# Patient Record
Sex: FEMALE | Race: WHITE | NOT HISPANIC OR LATINO | Employment: UNEMPLOYED | URBAN - NONMETROPOLITAN AREA
[De-identification: names, ages, dates, MRNs, and addresses within clinical notes are randomized per-mention and may not be internally consistent; named-entity substitution may affect disease eponyms.]

---

## 2017-02-22 ENCOUNTER — OFFICE VISIT (OUTPATIENT)
Dept: CARDIOLOGY | Facility: CLINIC | Age: 52
End: 2017-02-22

## 2017-02-22 VITALS
DIASTOLIC BLOOD PRESSURE: 60 MMHG | BODY MASS INDEX: 23.78 KG/M2 | HEIGHT: 66 IN | OXYGEN SATURATION: 97 % | WEIGHT: 148 LBS | SYSTOLIC BLOOD PRESSURE: 132 MMHG | HEART RATE: 116 BPM

## 2017-02-22 DIAGNOSIS — R07.9 CHEST PAIN, UNSPECIFIED TYPE: Primary | ICD-10-CM

## 2017-02-22 DIAGNOSIS — Z72.0 TOBACCO ABUSE: ICD-10-CM

## 2017-02-22 DIAGNOSIS — K21.9 GASTROESOPHAGEAL REFLUX DISEASE, ESOPHAGITIS PRESENCE NOT SPECIFIED: ICD-10-CM

## 2017-02-22 PROBLEM — I35.8 AORTIC VALVE SCLEROSIS: Status: ACTIVE | Noted: 2017-02-22

## 2017-02-22 PROBLEM — F41.9 ANXIETY: Status: ACTIVE | Noted: 2017-02-22

## 2017-02-22 PROBLEM — E03.9 HYPOTHYROID: Status: ACTIVE | Noted: 2017-02-22

## 2017-02-22 PROBLEM — G89.29 CHRONIC PAIN: Status: ACTIVE | Noted: 2017-02-22

## 2017-02-22 PROBLEM — F90.9 ADHD (ATTENTION DEFICIT HYPERACTIVITY DISORDER): Status: ACTIVE | Noted: 2017-02-22

## 2017-02-22 PROBLEM — R60.9 CHRONIC EDEMA: Status: ACTIVE | Noted: 2017-02-22

## 2017-02-22 PROCEDURE — 93000 ELECTROCARDIOGRAM COMPLETE: CPT | Performed by: INTERNAL MEDICINE

## 2017-02-22 PROCEDURE — 99214 OFFICE O/P EST MOD 30 MIN: CPT | Performed by: INTERNAL MEDICINE

## 2017-02-22 RX ORDER — PANTOPRAZOLE SODIUM 40 MG/1
40 TABLET, DELAYED RELEASE ORAL DAILY
COMMUNITY
Start: 2017-02-18

## 2017-02-22 RX ORDER — CONJUGATED ESTROGENS 0.62 MG/G
0.62 CREAM VAGINAL WEEKLY
COMMUNITY
Start: 2017-02-18

## 2017-02-22 RX ORDER — TRIAMTERENE AND HYDROCHLOROTHIAZIDE 37.5; 25 MG/1; MG/1
CAPSULE ORAL AS NEEDED
COMMUNITY
Start: 2017-02-18

## 2017-02-22 RX ORDER — NICOTINE 21 MG/24HR
1 PATCH, TRANSDERMAL 24 HOURS TRANSDERMAL EVERY 24 HOURS
Qty: 30 PATCH | Refills: 11 | Status: SHIPPED | OUTPATIENT
Start: 2017-02-22 | End: 2017-08-16 | Stop reason: SDUPTHER

## 2017-02-22 RX ORDER — LOPERAMIDE HYDROCHLORIDE 2 MG/1
2 CAPSULE ORAL DAILY
COMMUNITY
Start: 2017-02-11

## 2017-02-22 RX ORDER — HYDROCODONE BITARTRATE AND ACETAMINOPHEN 10; 325 MG/1; MG/1
10-325 TABLET ORAL 4 TIMES DAILY
COMMUNITY
Start: 2017-02-18

## 2017-02-22 RX ORDER — CYANOCOBALAMIN 1000 UG/ML
1000 INJECTION, SOLUTION INTRAMUSCULAR; SUBCUTANEOUS
COMMUNITY
Start: 2017-02-18

## 2017-02-22 RX ORDER — NYSTATIN 100000 U/G
10000 CREAM TOPICAL
COMMUNITY
Start: 2017-02-18

## 2017-02-22 RX ORDER — SUCRALFATE 1 G/1
1 TABLET ORAL AS NEEDED
COMMUNITY
Start: 2017-02-11

## 2017-02-22 RX ORDER — DEXTROAMPHETAMINE SACCHARATE, AMPHETAMINE ASPARTATE MONOHYDRATE, DEXTROAMPHETAMINE SULFATE AND AMPHETAMINE SULFATE 6.25; 6.25; 6.25; 6.25 MG/1; MG/1; MG/1; MG/1
25 CAPSULE, EXTENDED RELEASE ORAL DAILY
COMMUNITY
Start: 2017-02-18

## 2017-02-22 RX ORDER — LEVOTHYROXINE SODIUM 25 MCG
25 TABLET ORAL DAILY
COMMUNITY
Start: 2017-01-31

## 2017-02-22 RX ORDER — DIAZEPAM 10 MG/1
10 TABLET ORAL 4 TIMES DAILY
COMMUNITY
Start: 2017-02-18

## 2017-02-22 RX ORDER — CETIRIZINE HYDROCHLORIDE 10 MG/1
10 TABLET ORAL DAILY
COMMUNITY
Start: 2017-01-19

## 2017-02-22 RX ORDER — NAPROXEN 500 MG/1
500 TABLET ORAL AS NEEDED
COMMUNITY
Start: 2017-02-18

## 2017-02-22 RX ORDER — CLOTRIMAZOLE 1 %
1 CREAM (GRAM) TOPICAL DAILY
COMMUNITY
Start: 2016-11-21

## 2017-02-22 RX ORDER — FLUTICASONE PROPIONATE 50 MCG
50 SPRAY, SUSPENSION (ML) NASAL DAILY
COMMUNITY
Start: 2017-02-18

## 2017-02-22 RX ORDER — MELOXICAM 7.5 MG/1
7.5 TABLET ORAL AS NEEDED
COMMUNITY
Start: 2017-02-18

## 2017-02-22 NOTE — PROGRESS NOTES
Reason for Visit: chest pain.    HPI:  Teresa Long is a 52 y.o. female is here today for follow-up.  She has significant past medical history of aortic valve sclerosis, chronic edema, tobacco abuse, GERD, hypothyroidism, anal cancer, anxiety/depression, history of colon cancer, and chronic pain.  She had an episode of chest pain about 2 weeks that lasted about 2 days before subsiding.  She thinks that gas pain might have played a role.  She denies any palpitations but does feel like her heart rate is irregular at times.  She smokes 1 PPD.      Patient Active Problem List   Diagnosis   • Aortic valve sclerosis   • Chronic edema   • Tobacco abuse   • GERD (gastroesophageal reflux disease)   • Hypothyroid   • Anxiety   • ADHD (attention deficit hyperactivity disorder)   • Chronic pain       Social History   Substance Use Topics   • Smoking status: Current Some Day Smoker     Packs/day: 1.00   • Smokeless tobacco: Never Used   • Alcohol use Yes      Comment: Occasional       Family History   Problem Relation Age of Onset   • Stroke Mother    • Heart failure Mother        The following portions of the patient's history were reviewed and updated as appropriate: allergies, current medications, past family history, past medical history, past social history, past surgical history and problem list.      Current Outpatient Prescriptions:   •  ADVAIR DISKUS 100-50 MCG/DOSE DISKUS, Daily., Disp: , Rfl:   •  amphetamine-dextroamphetamine XR (ADDERALL XR) 25 MG 24 hr capsule, Take 25 mg by mouth Daily., Disp: , Rfl:   •  cetirizine (zyrTEC) 10 MG tablet, Take 10 mg by mouth Daily., Disp: , Rfl:   •  clotrimazole (LOTRIMIN) 1 % cream, Apply 1 application topically Daily., Disp: , Rfl:   •  cyanocobalamin 1000 MCG/ML injection, Inject 1,000 mg into the shoulder, thigh, or buttocks Every 30 (Thirty) Days., Disp: , Rfl:   •  diazePAM (VALIUM) 10 MG tablet, Take 10 mg by mouth 4 (Four) Times a Day., Disp: , Rfl:   •  fluticasone  (FLONASE) 50 MCG/ACT nasal spray, 50 sprays into each nostril Daily., Disp: , Rfl:   •  HYDROcodone-acetaminophen (NORCO)  MG per tablet, Take  tablets by mouth 4 (Four) Times a Day., Disp: , Rfl:   •  loperamide (IMODIUM) 2 MG capsule, Take 2 mg by mouth Daily., Disp: , Rfl:   •  meloxicam (MOBIC) 7.5 MG tablet, Take 7.5 mg by mouth As Needed., Disp: , Rfl:   •  naproxen (NAPROSYN) 500 MG tablet, Take 500 mg by mouth As Needed., Disp: , Rfl:   •  nystatin (MYCOSTATIN) 210649 UNIT/GM cream, Apply 10,000 application topically., Disp: , Rfl:   •  pantoprazole (PROTONIX) 40 MG EC tablet, Take 40 mg by mouth Daily., Disp: , Rfl:   •  PREMARIN 0.625 MG/GM vaginal cream, 0.625 application 1 (One) Time Per Week., Disp: , Rfl:   •  sertraline (ZOLOFT) 50 MG tablet, Take 50 mg by mouth Daily., Disp: , Rfl:   •  sucralfate (CARAFATE) 1 G tablet, Take 1 g by mouth As Needed., Disp: , Rfl:   •  SYNTHROID 25 MCG tablet, Take 25 mg by mouth Daily., Disp: , Rfl:   •  triamterene-hydrochlorothiazide (DYAZIDE) 37.5-25 MG per capsule, As Needed., Disp: , Rfl:   •  VENTOLIN  (90 BASE) MCG/ACT inhaler, As Needed., Disp: , Rfl:     Review of Systems   Constitution: Positive for decreased appetite and weakness. Negative for chills and fever.   HENT: Positive for congestion and headaches. Negative for nosebleeds.    Eyes: Negative for blurred vision and double vision.   Cardiovascular: Positive for chest pain, irregular heartbeat and leg swelling. Negative for palpitations and syncope.   Respiratory: Positive for cough, shortness of breath and wheezing.    Endocrine: Positive for heat intolerance. Negative for cold intolerance.   Hematologic/Lymphatic: Does not bruise/bleed easily.   Skin: Positive for dry skin. Negative for rash.   Musculoskeletal: Positive for back pain, joint pain, muscle cramps (legs), neck pain and stiffness.   Gastrointestinal: Positive for diarrhea and melena. Negative for abdominal pain,  "constipation, heartburn, nausea and vomiting.   Genitourinary: Negative for hematuria and nocturia.   Neurological: Positive for dizziness. Negative for light-headedness, loss of balance and numbness.   Psychiatric/Behavioral: Positive for depression. The patient is nervous/anxious. The patient does not have insomnia.        Objective   Visit Vitals   • /60 (BP Location: Right arm, Patient Position: Sitting, Cuff Size: Adult)   • Pulse 116   • Ht 66\" (167.6 cm)   • Wt 148 lb (67.1 kg)   • SpO2 97%   • BMI 23.89 kg/m2     Physical Exam   Constitutional: She is oriented to person, place, and time. She appears well-developed and well-nourished.   HENT:   Head: Normocephalic and atraumatic.   Cardiovascular: Regular rhythm and normal heart sounds.  Tachycardia present.    No murmur heard.  Pulmonary/Chest: Effort normal and breath sounds normal.   Musculoskeletal: She exhibits no edema.   Neurological: She is alert and oriented to person, place, and time.   Skin: Skin is warm and dry.   Psychiatric: She has a normal mood and affect.       ECG 12 Lead  Date/Time: 2/22/2017 11:39 AM  Performed by: NARESH NAVARRO  Authorized by: NARESH NAVARRO   Comparison: compared with previous ECG from 2/15/2017  Similar to previous ECG  Rhythm: sinus tachycardia  Other findings: LVH and LAE              ICD-10-CM ICD-9-CM   1. Chest pain, unspecified type R07.9 786.50   2. Tobacco abuse Z72.0 305.1   3. Gastroesophageal reflux disease, esophagitis presence not specified K21.9 530.81         Assessment/Plan:  1. Chest pain: Etiology unclear.  Is a heavy smoker putting her at increased risk for cardiac events.  Will evaluate further with a stress echo.      2. Tobacco abuse:   on smoking cessation.  Currently smoking 1 PPD and interested in quiting.  Will prescribe nicotine patches.      3. GERD: Possibly playing a role in chest pain.  Currently managed on acid reflux.      "

## 2017-06-14 ENCOUNTER — OUTSIDE FACILITY SERVICE (OUTPATIENT)
Dept: CARDIOLOGY | Facility: CLINIC | Age: 52
End: 2017-06-14

## 2017-06-14 PROCEDURE — 93018 CV STRESS TEST I&R ONLY: CPT | Performed by: INTERNAL MEDICINE

## 2017-06-14 PROCEDURE — 93350 STRESS TTE ONLY: CPT | Performed by: INTERNAL MEDICINE

## 2017-08-16 ENCOUNTER — OFFICE VISIT (OUTPATIENT)
Dept: CARDIOLOGY | Facility: CLINIC | Age: 52
End: 2017-08-16

## 2017-08-16 VITALS
HEIGHT: 66 IN | OXYGEN SATURATION: 98 % | WEIGHT: 148 LBS | DIASTOLIC BLOOD PRESSURE: 70 MMHG | SYSTOLIC BLOOD PRESSURE: 140 MMHG | HEART RATE: 115 BPM | BODY MASS INDEX: 23.78 KG/M2

## 2017-08-16 DIAGNOSIS — Z72.0 TOBACCO ABUSE: ICD-10-CM

## 2017-08-16 DIAGNOSIS — R07.89 OTHER CHEST PAIN: Primary | ICD-10-CM

## 2017-08-16 DIAGNOSIS — I10 ESSENTIAL HYPERTENSION: ICD-10-CM

## 2017-08-16 DIAGNOSIS — R00.0 SINUS TACHYCARDIA: ICD-10-CM

## 2017-08-16 PROCEDURE — 99214 OFFICE O/P EST MOD 30 MIN: CPT | Performed by: INTERNAL MEDICINE

## 2017-08-16 PROCEDURE — 93000 ELECTROCARDIOGRAM COMPLETE: CPT | Performed by: INTERNAL MEDICINE

## 2017-08-16 RX ORDER — LANCETS 33 GAUGE
EACH MISCELLANEOUS
COMMUNITY
Start: 2017-07-26

## 2017-08-16 RX ORDER — CARVEDILOL 3.12 MG/1
3.12 TABLET ORAL 2 TIMES DAILY
Qty: 60 TABLET | Refills: 11 | Status: SHIPPED | OUTPATIENT
Start: 2017-08-16

## 2017-08-16 RX ORDER — BLOOD-GLUCOSE METER
EACH MISCELLANEOUS
COMMUNITY
Start: 2017-07-26

## 2017-08-16 RX ORDER — NICOTINE 21 MG/24HR
1 PATCH, TRANSDERMAL 24 HOURS TRANSDERMAL EVERY 24 HOURS
Qty: 30 PATCH | Refills: 11 | Status: SHIPPED | OUTPATIENT
Start: 2017-08-16 | End: 2019-05-01
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
## 2018-09-05 DIAGNOSIS — Z72.0 TOBACCO ABUSE: ICD-10-CM

## 2018-09-06 RX ORDER — NICOTINE 21 MG/24HR
PATCH, TRANSDERMAL 24 HOURS TRANSDERMAL
OUTPATIENT
Start: 2018-09-06

## 2019-01-09 ENCOUNTER — LAB REQUISITION (OUTPATIENT)
Dept: LAB | Facility: HOSPITAL | Age: 54
End: 2019-01-09

## 2019-01-09 DIAGNOSIS — Z00.00 ENCOUNTER FOR GENERAL ADULT MEDICAL EXAMINATION WITHOUT ABNORMAL FINDINGS: ICD-10-CM

## 2019-01-09 LAB
ALBUMIN SERPL-MCNC: 4.3 G/DL (ref 3.5–5)
ALBUMIN/GLOB SERPL: 1.6 G/DL (ref 1.1–2.5)
ALP SERPL-CCNC: 69 U/L (ref 24–120)
ALT SERPL W P-5'-P-CCNC: 26 U/L (ref 0–54)
ANION GAP SERPL CALCULATED.3IONS-SCNC: 9 MMOL/L (ref 4–13)
AST SERPL-CCNC: 23 U/L (ref 7–45)
BILIRUB SERPL-MCNC: 0.5 MG/DL (ref 0.1–1)
BUN BLD-MCNC: 10 MG/DL (ref 5–21)
BUN/CREAT SERPL: 18.9 (ref 7–25)
CALCIUM SPEC-SCNC: 10 MG/DL (ref 8.4–10.4)
CEA SERPL-MCNC: 2.21 NG/ML (ref 0–5)
CHLORIDE SERPL-SCNC: 99 MMOL/L (ref 98–110)
CO2 SERPL-SCNC: 31 MMOL/L (ref 24–31)
CREAT BLD-MCNC: 0.53 MG/DL (ref 0.5–1.4)
GFR SERPL CREATININE-BSD FRML MDRD: 120 ML/MIN/1.73
GLOBULIN UR ELPH-MCNC: 2.7 GM/DL
GLUCOSE BLD-MCNC: 108 MG/DL (ref 70–100)
POTASSIUM BLD-SCNC: 4.2 MMOL/L (ref 3.5–5.3)
PROT SERPL-MCNC: 7 G/DL (ref 6.3–8.7)
SODIUM BLD-SCNC: 139 MMOL/L (ref 135–145)

## 2019-01-09 PROCEDURE — 82378 CARCINOEMBRYONIC ANTIGEN: CPT | Performed by: INTERNAL MEDICINE

## 2019-01-09 PROCEDURE — 80053 COMPREHEN METABOLIC PANEL: CPT | Performed by: INTERNAL MEDICINE

## 2019-05-01 ENCOUNTER — OFFICE VISIT (OUTPATIENT)
Dept: CARDIOLOGY | Facility: CLINIC | Age: 54
End: 2019-05-01

## 2019-05-01 VITALS
BODY MASS INDEX: 22.82 KG/M2 | OXYGEN SATURATION: 97 % | HEART RATE: 91 BPM | SYSTOLIC BLOOD PRESSURE: 128 MMHG | DIASTOLIC BLOOD PRESSURE: 86 MMHG | WEIGHT: 142 LBS | HEIGHT: 66 IN

## 2019-05-01 DIAGNOSIS — Z72.0 TOBACCO ABUSE: ICD-10-CM

## 2019-05-01 DIAGNOSIS — I10 ESSENTIAL HYPERTENSION: ICD-10-CM

## 2019-05-01 DIAGNOSIS — R00.0 SINUS TACHYCARDIA: Primary | ICD-10-CM

## 2019-05-01 DIAGNOSIS — Z01.818 PREOPERATIVE EVALUATION TO RULE OUT SURGICAL CONTRAINDICATION: ICD-10-CM

## 2019-05-01 PROBLEM — I35.8 AORTIC VALVE SCLEROSIS: Status: RESOLVED | Noted: 2017-02-22 | Resolved: 2019-05-01

## 2019-05-01 PROCEDURE — 93000 ELECTROCARDIOGRAM COMPLETE: CPT | Performed by: INTERNAL MEDICINE

## 2019-05-01 PROCEDURE — 99406 BEHAV CHNG SMOKING 3-10 MIN: CPT | Performed by: INTERNAL MEDICINE

## 2019-05-01 PROCEDURE — 99214 OFFICE O/P EST MOD 30 MIN: CPT | Performed by: INTERNAL MEDICINE

## 2019-05-01 NOTE — PROGRESS NOTES
Reason for Visit: cardiovascular follow up.    HPI:  Teresa Long is a 54 y.o. female is here today for follow-up.  She has been dealing with a lot of stress in her life.  She is getting ready to have a colonoscopy.  She notices intermittent sinus tachycardia.  Often times it is when she is rushed or stressed.  She denies any chest pain, palpitations, dizziness, syncope, PND, or orthopnea.  Her blood pressure has been running normal at home.  She continues to smoke about 1/2 PPD.      Previous Cardiac Testing and Procedures:  - Exercise stress echo (06/14/2017) negative for ischemia, below average functional capacity.    Patient Active Problem List   Diagnosis   • Chronic edema   • Tobacco abuse   • GERD (gastroesophageal reflux disease)   • Hypothyroid   • Anxiety   • ADHD (attention deficit hyperactivity disorder)   • Chronic pain   • Essential hypertension       Social History     Tobacco Use   • Smoking status: Current Every Day Smoker     Packs/day: 0.50   • Smokeless tobacco: Never Used   Substance Use Topics   • Alcohol use: Yes     Comment: Occasional   • Drug use: No       Family History   Problem Relation Age of Onset   • Stroke Mother    • Heart failure Mother        The following portions of the patient's history were reviewed and updated as appropriate: allergies, current medications, past family history, past medical history, past social history, past surgical history and problem list.      Current Outpatient Medications:   •  ADVAIR DISKUS 100-50 MCG/DOSE DISKUS, Daily., Disp: , Rfl:   •  amphetamine-dextroamphetamine XR (ADDERALL XR) 25 MG 24 hr capsule, Take 25 mg by mouth Daily., Disp: , Rfl:   •  Blood Glucose Monitoring Suppl (ONE TOUCH ULTRA 2) w/Device kit, , Disp: , Rfl:   •  carvedilol (COREG) 3.125 MG tablet, Take 1 tablet by mouth 2 (Two) Times a Day., Disp: 60 tablet, Rfl: 11  •  cetirizine (zyrTEC) 10 MG tablet, Take 10 mg by mouth Daily., Disp: , Rfl:   •  clotrimazole (LOTRIMIN) 1 %  cream, Apply 1 application topically Daily., Disp: , Rfl:   •  cyanocobalamin 1000 MCG/ML injection, Inject 1,000 mg into the shoulder, thigh, or buttocks Every 30 (Thirty) Days., Disp: , Rfl:   •  diazePAM (VALIUM) 10 MG tablet, Take 10 mg by mouth 4 (Four) Times a Day., Disp: , Rfl:   •  fluticasone (FLONASE) 50 MCG/ACT nasal spray, 50 sprays into each nostril Daily., Disp: , Rfl:   •  HYDROcodone-acetaminophen (NORCO)  MG per tablet, Take  tablets by mouth 4 (Four) Times a Day., Disp: , Rfl:   •  loperamide (IMODIUM) 2 MG capsule, Take 2 mg by mouth Daily., Disp: , Rfl:   •  meloxicam (MOBIC) 7.5 MG tablet, Take 7.5 mg by mouth As Needed., Disp: , Rfl:   •  naproxen (NAPROSYN) 500 MG tablet, Take 500 mg by mouth As Needed., Disp: , Rfl:   •  nystatin (MYCOSTATIN) 497094 UNIT/GM cream, Apply 10,000 application topically., Disp: , Rfl:   •  ONE TOUCH ULTRA TEST test strip, , Disp: , Rfl:   •  ONETOUCH DELICA LANCETS 33G misc, , Disp: , Rfl:   •  pantoprazole (PROTONIX) 40 MG EC tablet, Take 40 mg by mouth Daily., Disp: , Rfl:   •  PREMARIN 0.625 MG/GM vaginal cream, 0.625 application 1 (One) Time Per Week., Disp: , Rfl:   •  sertraline (ZOLOFT) 50 MG tablet, Take 50 mg by mouth Daily., Disp: , Rfl:   •  sucralfate (CARAFATE) 1 G tablet, Take 1 g by mouth As Needed., Disp: , Rfl:   •  SYNTHROID 25 MCG tablet, Take 25 mg by mouth Daily., Disp: , Rfl:   •  triamterene-hydrochlorothiazide (DYAZIDE) 37.5-25 MG per capsule, As Needed., Disp: , Rfl:   •  VENTOLIN  (90 BASE) MCG/ACT inhaler, As Needed., Disp: , Rfl:     Review of Systems   Constitution: Negative for chills and fever.   Cardiovascular: Positive for irregular heartbeat. Negative for chest pain and paroxysmal nocturnal dyspnea.   Respiratory: Negative for cough and shortness of breath.    Skin: Negative for rash.   Gastrointestinal: Negative for abdominal pain and heartburn.   Neurological: Negative for dizziness and numbness.  "  Psychiatric/Behavioral: The patient is nervous/anxious.        Objective   /86 (BP Location: Right arm, Patient Position: Sitting, Cuff Size: Adult)   Pulse 91   Ht 167.6 cm (66\")   Wt 64.4 kg (142 lb)   SpO2 97%   BMI 22.92 kg/m²   Physical Exam   Constitutional: She is oriented to person, place, and time. She appears well-developed and well-nourished.   HENT:   Head: Normocephalic and atraumatic.   Cardiovascular: Normal rate, regular rhythm and normal heart sounds.   No murmur heard.  Pulmonary/Chest: Effort normal and breath sounds normal.   Abdominal: She exhibits no distension.   Musculoskeletal: She exhibits no edema.   Neurological: She is alert and oriented to person, place, and time.   Skin: Skin is warm and dry.   Psychiatric: She has a normal mood and affect.       ECG 12 Lead  Date/Time: 5/1/2019 2:09 PM  Performed by: Deven Terrell MD  Authorized by: Deven Terrell MD   Comparison: compared with previous ECG from 8/16/2017  Similar to previous ECG  Rhythm: sinus rhythm  Rate: normal  Other findings: non-specific ST-T wave changes, left ventricular hypertrophy and bilateral atrial abnormality              ICD-10-CM ICD-9-CM   1. Sinus tachycardia R00.0 427.89   2. Tobacco abuse Z72.0 305.1   3. Essential hypertension I10 401.9   4. Preoperative evaluation to rule out surgical contraindication Z01.818 V72.83         Assessment/Plan:  1. Sinus tachycardia: Likely secondary to Adderall and stress.  No evidence of any arrhythmia.       2. Tobacco abuse:  Smoking about 1/2 PPD.  I advised Teresa of the risks of continuing to use tobacco, and I provided her with tobacco cessation educational materials in the After Visit Summary.  During this visit, I spent 4 minutes counseling the patient regarding tobacco cessation.      3. Hypertension: Blood pressure is borderline today.  Usually well controlled at home.  Continue to monitor.      4. Preoperative evaluation: Patient has upcoming " colonoscopy.  Patient is low risk for a low risk procedure.  She has no significant cardiac problems.  No further cardiac testing is indicated at this time.

## 2019-12-27 DIAGNOSIS — C18.9 MALIGNANT NEOPLASM OF COLON, UNSPECIFIED PART OF COLON (HCC): Primary | ICD-10-CM

## 2019-12-30 ENCOUNTER — LAB (OUTPATIENT)
Dept: ONCOLOGY | Facility: CLINIC | Age: 54
End: 2019-12-30

## 2019-12-30 DIAGNOSIS — C18.9 MALIGNANT NEOPLASM OF COLON, UNSPECIFIED PART OF COLON (HCC): ICD-10-CM

## 2019-12-30 PROBLEM — C20 RECTAL CANCER: Status: ACTIVE | Noted: 2019-12-30

## 2019-12-30 LAB
ALBUMIN SERPL-MCNC: 4.5 G/DL (ref 3.5–5.2)
ALBUMIN/GLOB SERPL: 1.7 G/DL
ALP SERPL-CCNC: 84 U/L (ref 39–117)
ALT SERPL W P-5'-P-CCNC: 20 U/L (ref 1–33)
ANION GAP SERPL CALCULATED.3IONS-SCNC: 16 MMOL/L (ref 5–15)
AST SERPL-CCNC: 22 U/L (ref 1–32)
BASOPHILS # BLD AUTO: 0.01 10*3/MM3 (ref 0–0.2)
BASOPHILS NFR BLD AUTO: 0.2 % (ref 0–1.5)
BILIRUB SERPL-MCNC: 0.4 MG/DL (ref 0.2–1.2)
BUN BLD-MCNC: 10 MG/DL (ref 6–20)
BUN/CREAT SERPL: 15.2 (ref 7–25)
CALCIUM SPEC-SCNC: 9.1 MG/DL (ref 8.6–10.5)
CHLORIDE SERPL-SCNC: 99 MMOL/L (ref 98–107)
CO2 SERPL-SCNC: 24 MMOL/L (ref 22–29)
CREAT BLD-MCNC: 0.66 MG/DL (ref 0.57–1)
DEPRECATED RDW RBC AUTO: 45 FL (ref 37–54)
EOSINOPHIL # BLD AUTO: 0.08 10*3/MM3 (ref 0–0.4)
EOSINOPHIL NFR BLD AUTO: 1.2 % (ref 0.3–6.2)
ERYTHROCYTE [DISTWIDTH] IN BLOOD BY AUTOMATED COUNT: 12.9 % (ref 12.3–15.4)
GFR SERPL CREATININE-BSD FRML MDRD: 93 ML/MIN/1.73
GLOBULIN UR ELPH-MCNC: 2.6 GM/DL
GLUCOSE BLD-MCNC: 136 MG/DL (ref 65–99)
HCT VFR BLD AUTO: 39.3 % (ref 34–46.6)
HGB BLD-MCNC: 13.3 G/DL (ref 12–15.9)
IMM GRANULOCYTES # BLD AUTO: 0.01 10*3/MM3 (ref 0–0.05)
IMM GRANULOCYTES NFR BLD AUTO: 0.2 % (ref 0–0.5)
LYMPHOCYTES # BLD AUTO: 1.83 10*3/MM3 (ref 0.7–3.1)
LYMPHOCYTES NFR BLD AUTO: 27.9 % (ref 19.6–45.3)
MCH RBC QN AUTO: 31.6 PG (ref 26.6–33)
MCHC RBC AUTO-ENTMCNC: 33.8 G/DL (ref 31.5–35.7)
MCV RBC AUTO: 93.3 FL (ref 79–97)
MONOCYTES # BLD AUTO: 0.85 10*3/MM3 (ref 0.1–0.9)
MONOCYTES NFR BLD AUTO: 13 % (ref 5–12)
NEUTROPHILS # BLD AUTO: 3.77 10*3/MM3 (ref 1.7–7)
NEUTROPHILS NFR BLD AUTO: 57.5 % (ref 42.7–76)
PLATELET # BLD AUTO: 334 10*3/MM3 (ref 140–450)
PMV BLD AUTO: 8.2 FL (ref 6–12)
POTASSIUM BLD-SCNC: 3.4 MMOL/L (ref 3.5–5.2)
PROT SERPL-MCNC: 7.1 G/DL (ref 6–8.5)
RBC # BLD AUTO: 4.21 10*6/MM3 (ref 3.77–5.28)
SODIUM BLD-SCNC: 139 MMOL/L (ref 136–145)
WBC NRBC COR # BLD: 6.55 10*3/MM3 (ref 3.4–10.8)

## 2019-12-30 PROCEDURE — 85025 COMPLETE CBC W/AUTO DIFF WBC: CPT | Performed by: INTERNAL MEDICINE

## 2019-12-30 PROCEDURE — 80053 COMPREHEN METABOLIC PANEL: CPT | Performed by: INTERNAL MEDICINE

## 2019-12-30 PROCEDURE — 82378 CARCINOEMBRYONIC ANTIGEN: CPT | Performed by: INTERNAL MEDICINE

## 2019-12-31 LAB — CEA SERPL-MCNC: 2.68 NG/ML

## 2020-01-06 ENCOUNTER — OFFICE VISIT (OUTPATIENT)
Dept: ONCOLOGY | Facility: CLINIC | Age: 55
End: 2020-01-06

## 2020-01-06 VITALS
RESPIRATION RATE: 18 BRPM | TEMPERATURE: 98 F | WEIGHT: 138 LBS | DIASTOLIC BLOOD PRESSURE: 80 MMHG | HEART RATE: 65 BPM | SYSTOLIC BLOOD PRESSURE: 138 MMHG | BODY MASS INDEX: 22.18 KG/M2 | HEIGHT: 66 IN | OXYGEN SATURATION: 94 %

## 2020-01-06 DIAGNOSIS — C20 RECTAL CANCER (HCC): Primary | ICD-10-CM

## 2020-01-06 PROCEDURE — 99214 OFFICE O/P EST MOD 30 MIN: CPT | Performed by: INTERNAL MEDICINE

## 2020-01-15 ENCOUNTER — APPOINTMENT (OUTPATIENT)
Dept: CT IMAGING | Facility: HOSPITAL | Age: 55
End: 2020-01-15

## 2020-12-14 ENCOUNTER — TELEPHONE (OUTPATIENT)
Dept: ONCOLOGY | Facility: CLINIC | Age: 55
End: 2020-12-14

## 2020-12-14 NOTE — TELEPHONE ENCOUNTER
DR ANTOINE PT: Left  for pt to call back. Need to r/s David appcally in [place]. Dr Antoine not going to that office at this time.

## 2021-02-10 ENCOUNTER — TELEPHONE (OUTPATIENT)
Dept: ONCOLOGY | Facility: CLINIC | Age: 56
End: 2021-02-10

## 2021-02-10 NOTE — TELEPHONE ENCOUNTER
Caller: Teresa     Relationship to patient: Pt    Best call back number: [phone]     Type of visit: Lab and follow up     Requested date: Late February or early March, Prefers afternoon    If rescheduling, when is the original appointment: 01/11 and 01/18

## 2021-03-09 PROCEDURE — 82378 CARCINOEMBRYONIC ANTIGEN: CPT | Performed by: NURSE PRACTITIONER

## 2021-03-09 PROCEDURE — 80053 COMPREHEN METABOLIC PANEL: CPT | Performed by: NURSE PRACTITIONER

## 2021-03-12 NOTE — PROGRESS NOTES
MGW ONC [facility] GROUP HEMATOLOGY AND ONCOLOGY  [address]  [place] [address] [phone]    Patient Name: Teresa Long  Encounter Date: 03/18/2021  YOB: 1965  Patient Number: [id number]      REASON FOR FOLLOW-UP: Ms. Teresa Long is a pleasant 56-year-old  female who is seen on follow-up for Stage IIB (cT2, cN2, M0) rectal cancer.  She had completed 5-FU with mitomycin C and radiation 134.25 months ago.  She is seen alone.  She is a reliable historian.        Oncology/Hematology History Overview Note   DIAGNOSTIC ABNORMALITIES:  The patient had undergone endoscopic ultrasound 11/04/2009. It showed a T3 lower rectal lesion with enlarged lymph node in the perirectal tissue, 10 cm from the anal verge.  PET scan 11/06/2009 showed intense FDG uptake with SUV approximately 8 along the left aspect of the rectum/anus. Left inguinal and bilateral small iliac nodes demonstrated no abnormal uptake. The patient is planned for neoadjuvant chemoradiation.  The patient was seen by Dr. Leonard 11/11/2009. The patient presented with blood in her stool. Colonoscopy showed a 3 cm ulcerated distal rectal mass. Biopsies showed invasive squamous cell carcinoma. The patient also had multiple sessile 1-2 mm polyps between 10 and 15 cm; all were removed. There is a cyst in the liver, benign.   Rectal junction biopsy 03/25/2010 [facility] negative for malignancy.   Bone scan 09/23/2011 negative for metastasis.  CT of the abdomen and pelvis 05/01/2012 showed hepatic cyst. Left renal stone. Thickening of the wall of the urinary bladder.  Flexible sigmoidoscopy by Dr. 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 No evidence of tumor recurrence.   The patient was last seen by Dr. Leonard 07/22/2015. She is in remission. CEA was normal.  Colonoscopy by Dr. Jasiel See 01/13/2016 showed radiation proctitis. Multiple colon polyps removed. No  evidence of tumor recurrence within the rectum.  The patient had undergone flexible sigmoidoscopy 05/02/2018. No evidence of tumor recurrence. Radiation proctitis and internal hemorrhoids were found.     PREVIOUS INTERVENTIONS:   The patient was given mitomycin C with continuous infusion of 5-FU, 11/19/2009 and cycle 2 was given 01/11/2010 by Dr. Leonard.       Rectal cancer (CMS/HCC)   12/30/2019 Initial Diagnosis    Rectal cancer (CMS/HCC)     12/30/2019 Cancer Staged    Staging form: Colon and Rectum, AJCC V7  - Clinical stage from 12/30/2019: Stage IIA (T3, N0, M0) - Signed by Rashaun Antoine MD on 12/30/2019         PAST MEDICAL HISTORY:  ALLERGIES:  Allergies   Allergen Reactions   • Desyrel [Trazodone]    • Keftab [Cephalexin]    • Ultram [Tramadol Hcl]    • Paxil [Paroxetine Hcl] Palpitations     CURRENT MEDICATIONS:  Outpatient Encounter Medications as of 3/18/2021   Medication Sig Dispense Refill   • ADVAIR DISKUS 100-50 MCG/DOSE DISKUS Daily.     • amphetamine-dextroamphetamine XR (ADDERALL XR) 25 MG 24 hr capsule Take 25 mg by mouth Daily.     • Blood Glucose Monitoring Suppl (ONE TOUCH ULTRA 2) w/Device kit      • carvedilol (COREG) 3.125 MG tablet Take 1 tablet by mouth 2 (Two) Times a Day. 60 tablet 11   • cetirizine (zyrTEC) 10 MG tablet Take 10 mg by mouth Daily.     • clotrimazole (LOTRIMIN) 1 % cream Apply 1 application topically Daily.     • cyanocobalamin 1000 MCG/ML injection Inject 1,000 mg into the shoulder, thigh, or buttocks Every 30 (Thirty) Days.     • diazePAM (VALIUM) 10 MG tablet Take 10 mg by mouth 4 (Four) Times a Day.     • fluticasone (FLONASE) 50 MCG/ACT nasal spray 50 sprays into each nostril Daily.     • HYDROcodone-acetaminophen (NORCO)  MG per tablet Take  tablets by mouth 4 (Four) Times a Day.     • loperamide (IMODIUM) 2 MG capsule Take 2 mg by mouth Daily.     • meloxicam (MOBIC) 7.5 MG tablet Take 7.5 mg by mouth As Needed.     • naproxen (NAPROSYN) 500 MG tablet  Take 500 mg by mouth As Needed.     • nystatin (MYCOSTATIN) 841207 UNIT/GM cream Apply 10,000 application topically.     • ONE TOUCH ULTRA TEST test strip      • ONETOUCH DELICA LANCETS 33G misc      • pantoprazole (PROTONIX) 40 MG EC tablet Take 40 mg by mouth Daily.     • PREMARIN 0.625 MG/GM vaginal cream 0.625 application 1 (One) Time Per Week.     • sertraline (ZOLOFT) 50 MG tablet Take 50 mg by mouth Daily.     • sucralfate (CARAFATE) 1 G tablet Take 1 g by mouth As Needed.     • SYNTHROID 25 MCG tablet Take 25 mg by mouth Daily.     • triamterene-hydrochlorothiazide (DYAZIDE) 37.5-25 MG per capsule As Needed.     • VENTOLIN  (90 BASE) MCG/ACT inhaler As Needed.       No facility-administered encounter medications on file as of 3/18/2021.     ADULT ILLNESSES:  Patient Active Problem List   Diagnosis Code   • Chronic edema R60.9   • Tobacco abuse Z72.0   • GERD (gastroesophageal reflux disease) K21.9   • Hypothyroid E03.9   • Anxiety F41.9   • ADHD (attention deficit hyperactivity disorder) F90.9   • Chronic pain G89.29   • Essential hypertension I10   • Rectal cancer (CMS/HCC) C20     SURGERIES:  Past Surgical History:   Procedure Laterality Date   • APPENDECTOMY     • DILATATION AND CURETTAGE      Of Uterus x 5   • GYNECOLOGIC CRYOSURGERY      Lesion of cervix   • TONSILLECTOMY     • VENOUS ACCESS DEVICE (PORT) INSERTION AND REMOVAL  03/17/2015    Port implant and taken out     HEALTH MAINTENANCE ITEMS:  Health Maintenance Due   Topic Date Due   • COLONOSCOPY  Never done   • ANNUAL PHYSICAL  Never done   • Pneumococcal Vaccine 0-64 (1 of 1 - PPSV23) Never done   • ZOSTER VACCINE (1 of 2) Never done   • TDAP/TD VACCINES (2 - Td) 01/01/2017   • HEPATITIS C SCREENING  Never done   • MAMMOGRAM  Never done   • PAP SMEAR  Never done   • INFLUENZA VACCINE  Never done       <no information>  Last Completed Colonoscopy       Status Date      COLONOSCOPY No completions recorded          There is no immunization  "history on file for this patient.  Last Completed Mammogram       Status Date      MAMMOGRAM No completions recorded            FAMILY HISTORY:  Family History   Problem Relation Age of Onset   • Stroke Mother    • Heart failure Mother      SOCIAL HISTORY:  Social History     Socioeconomic History   • Marital status: Unknown     Spouse name: Not on file   • Number of children: Not on file   • Years of education: Not on file   • Highest education level: Not on file   Tobacco Use   • Smoking status: Current Every Day Smoker     Packs/day: 0.50   • Smokeless tobacco: Never Used   Substance and Sexual Activity   • Alcohol use: Yes     Comment: Occasional   • Drug use: No       REVIEW OF SYSTEMS:    Review of Systems   Constitutional: Negative for chills, fatigue and fever.   Respiratory: Negative for cough, shortness of breath and wheezing.    Cardiovascular: Negative for chest pain and palpitations.   Gastrointestinal: Negative for abdominal pain, blood in stool, nausea and vomiting.   Genitourinary: Negative for difficulty urinating, dysuria and flank pain.   Musculoskeletal: Negative for joint swelling and neck stiffness.   Skin: Negative for pallor.   Neurological: Negative for dizziness, speech difficulty and weakness.   Psychiatric/Behavioral: Negative for agitation, confusion and hallucinations.       VITAL SIGNS: /80   Pulse 118   Temp 98.8 °F (37.1 °C)   Resp 18   Ht 167.6 cm (66\")   Wt 60.2 kg (132 lb 11.2 oz)   SpO2 97%   Breastfeeding No   BMI 21.42 kg/m²   Pain Score    03/18/21 1416   PainSc: 0-No pain       PHYSICAL EXAMINATION:     Physical Exam  Vitals reviewed.   Constitutional:       General: She is not in acute distress.  Cardiovascular:      Rate and Rhythm: Normal rate and regular rhythm.   Pulmonary:      Effort: No respiratory distress.      Breath sounds: No wheezing or rales.   Abdominal:      General: Bowel sounds are normal. There is no distension.      Palpations: Abdomen is " soft.   Skin:     General: Skin is warm and dry.      Coloration: Skin is not pale.   Neurological:      Mental Status: She is alert and oriented to person, place, and time.   Psychiatric:         Mood and Affect: Mood normal.         Behavior: Behavior normal.         Thought Content: Thought content normal.         Judgment: Judgment normal.         LABS    Lab Results - Last 18 Months   Lab Units 03/09/21  1104 12/30/19  1450   HEMOGLOBIN g/dL 13.5 13.3   HEMATOCRIT % 41.6 39.3   MCV fL 95.6 93.3   WBC 10*3/mm3 4.66 6.55   RDW % 12.9 12.9   MPV fL 8.0 8.2   PLATELETS 10*3/mm3 415 334   IMM GRAN % % 0.2 0.2   NEUTROS ABS 10*3/mm3 2.13 3.77   LYMPHS ABS 10*3/mm3 1.65 1.83   MONOS ABS 10*3/mm3 0.78 0.85   EOS ABS 10*3/mm3 0.07 0.08   BASOS ABS 10*3/mm3 0.02 0.01   IMMATURE GRANS (ABS) 10*3/mm3 0.01 0.01       Lab Results - Last 18 Months   Lab Units 03/09/21  1104 12/30/19  1450   GLUCOSE mg/dL 96 136*   SODIUM mmol/L 139 139   POTASSIUM mmol/L 4.3 3.4*   CO2 mmol/L 28.0 24.0   CHLORIDE mmol/L 101 99   ANION GAP mmol/L 10.0 16.0*   CREATININE mg/dL 0.45* 0.66   BUN mg/dL 12 10   BUN / CREAT RATIO  26.7* 15.2   CALCIUM mg/dL 9.6 9.1   EGFR IF NONAFRICN AM mL/min/1.73 143 93   ALK PHOS U/L 83 84   TOTAL PROTEIN g/dL 6.8 7.1   ALT (SGPT) U/L 22 20   AST (SGOT) U/L 24 22   BILIRUBIN mg/dL 0.2 0.4   ALBUMIN g/dL 4.20 4.50   GLOBULIN gm/dL 2.6 2.6       Lab Results - Last 18 Months   Lab Units 03/09/21  1104 12/30/19  1450   CEA ng/mL 2.77 2.68       No results for input(s): IRON, TIBC, LABIRON, FERRITIN, H4LHGHC, TSH, FOLATE in the last 01066 hours.    Invalid input(s): VITB12    Teresa Long reports a pain score of 0.        Patient's Body mass index is 21.42 kg/m². BMI is within normal parameters. No follow-up required..    ASSESSMENT:  1.   Rectal Cancer, 11/04/2009.  Current Stage: AJCC IIA (cT3, cN0, M0).  Baseline Node Status: Negative by PET.  Tumor Zapata: Left aspect of rectum.  Complications of Tumor:  "Hematochezia.  Treatment status: Mitomycin C with CIV 5-FU and radiation 11/19/2009 and 01/11/2010 with radiation. Complete response.  2.    Performance status of 0.  3.    Depression.  O Zoloft.   4.    Attention deficit disorder. On Adderral.   5.    Gastroesophageal reflux disease. On Protonix.   6.    Tobacco abuse.   7.    Lung nodules, right. Followed by Dr. Trevizo.        PLAN:  1.    Re:  Heme status. Hemoglobin 13.5.  2.    Re:  Pre office CMP. Unremarkable.  3.    Re:  Pre office CEA. Normal at 2.77.   4.    Re:  Continue surveillance.  She will be seen every 12 months.  5.   Continue to follow with Dr. Trevizo for lung nodules.  \"I go April 15th.\"  6.   Continue ongoing management per primary care physician and other specialists.  7.   Plan of care discussed with patient.   Understanding expressed.  Patient agreeable to proceed.  8.   Return to office in 12 months with pre office CBC with differential, CMP, and CEA.        I spent 21 total minutes, face-to-face, caring for Teresa schwartz.  Greater than 50% of this time involved counseling and/or coordination of care as documented within this note regarding the patient's illness(es), pros and cons of various treatment options, instructions and/or risk reduction.              cc: (James Lang MD)        (Jasiel See MD)        (Devante Hendricks MD)        MD Annie Woods, APRN      "

## 2021-03-18 ENCOUNTER — OFFICE VISIT (OUTPATIENT)
Dept: ONCOLOGY | Facility: CLINIC | Age: 56
End: 2021-03-18

## 2021-03-18 VITALS
BODY MASS INDEX: 21.33 KG/M2 | OXYGEN SATURATION: 97 % | WEIGHT: 132.7 LBS | DIASTOLIC BLOOD PRESSURE: 80 MMHG | SYSTOLIC BLOOD PRESSURE: 140 MMHG | HEART RATE: 118 BPM | RESPIRATION RATE: 18 BRPM | HEIGHT: 66 IN | TEMPERATURE: 98.8 F

## 2021-03-18 DIAGNOSIS — C20 RECTAL CANCER (HCC): Primary | ICD-10-CM

## 2021-03-18 PROCEDURE — 99213 OFFICE O/P EST LOW 20 MIN: CPT | Performed by: INTERNAL MEDICINE

## 2022-01-04 ENCOUNTER — TELEPHONE (OUTPATIENT)
Dept: ONCOLOGY | Facility: CLINIC | Age: 57
End: 2022-01-04

## 2022-01-04 NOTE — TELEPHONE ENCOUNTER
Spoke w/pt and relayed message from Agata. Pt v/u.    ----- Message from Agata Daniels MA sent at 1/4/2022 11:38 AM CST -----  Dr Lloyd (Dr Solano) in [place] is the one who had her set up for that originally.  She needs to contact them to RS if she cancelled or NS for that one.     ----- Message -----  From: Yenni Penaloza  Sent: 1/4/2022  11:24 AM CST  To: Agata Daniels MA    Spoke w/pt today to change her [place] lab appt. She states she needs her CT Chest re-ordered and is also asking if Dr Antoine would order CT Ab/Pelvis due to hip pain she has been having.

## 2022-04-07 ENCOUNTER — OFFICE VISIT (OUTPATIENT)
Dept: ONCOLOGY | Facility: CLINIC | Age: 57
End: 2022-04-07

## 2022-04-07 ENCOUNTER — TELEPHONE (OUTPATIENT)
Dept: ONCOLOGY | Facility: CLINIC | Age: 57
End: 2022-04-07

## 2022-04-07 VITALS
BODY MASS INDEX: 19.85 KG/M2 | TEMPERATURE: 98.2 F | RESPIRATION RATE: 18 BRPM | HEIGHT: 66 IN | SYSTOLIC BLOOD PRESSURE: 156 MMHG | WEIGHT: 123.5 LBS | HEART RATE: 110 BPM | OXYGEN SATURATION: 97 % | DIASTOLIC BLOOD PRESSURE: 80 MMHG

## 2022-04-07 DIAGNOSIS — C20 RECTAL CANCER: Primary | ICD-10-CM

## 2022-04-07 PROCEDURE — 99214 OFFICE O/P EST MOD 30 MIN: CPT | Performed by: INTERNAL MEDICINE

## 2022-04-07 NOTE — TELEPHONE ENCOUNTER
TYRONE WITH APPT DATE AND TIME FOR CT OF ABDOMEN AND PELVIS ON 04/13/2022 AT 8:30AM AT THE [facility] LOCATION. 04/07/2022 BS

## 2022-04-13 ENCOUNTER — HOSPITAL ENCOUNTER (OUTPATIENT)
Dept: CT IMAGING | Facility: HOSPITAL | Age: 57
Discharge: HOME | End: 2022-04-13
Admitting: INTERNAL MEDICINE

## 2022-04-13 ENCOUNTER — TELEPHONE (OUTPATIENT)
Dept: ONCOLOGY | Facility: CLINIC | Age: 57
End: 2022-04-13

## 2022-04-13 DIAGNOSIS — N32.89 BLADDER WALL THICKENING: Primary | ICD-10-CM

## 2022-04-13 DIAGNOSIS — C20 RECTAL CANCER: ICD-10-CM

## 2022-04-13 LAB — CREAT BLDA-MCNC: 0.6 MG/DL (ref 0.6–1.3)

## 2022-04-13 PROCEDURE — 74177 CT ABD & PELVIS W/CONTRAST: CPT

## 2022-04-13 PROCEDURE — 82565 ASSAY OF CREATININE: CPT

## 2022-04-13 PROCEDURE — 25010000002 IOPAMIDOL 61 % SOLUTION: Performed by: INTERNAL MEDICINE

## 2022-04-13 RX ADMIN — IOPAMIDOL 100 ML: 612 INJECTION, SOLUTION INTRAVENOUS at 09:32

## 2022-04-13 RX ADMIN — IOPAMIDOL 50 ML: 612 INJECTION, SOLUTION INTRAVENOUS at 09:32

## 2022-04-13 NOTE — TELEPHONE ENCOUNTER
----- Message from Rashaun Antoine MD sent at 4/13/2022 12:09 PM CDT -----  1.  Refer to Dr. Jasiel See for colonoscopy.  Mild wall thickening of the transverse and descending colon.  Findings may represent acute colitis in the appropriate clinical  setting.     2.  Refer to urology.  Diffuse urinary bladder wall thickening with LEFT lateral urinary  bladder diverticulum, suggesting chronic bladder dysfunction. Mild  diffuse dilatation of the LEFT ureter extending to the ureterovesical  junction where there is mild soft tissue thickening and appearance  suggestive of a ureterocele. Consider further evaluation with either  cystoscopy or CT urogram to exclude an underlying soft tissue mass  lesion.     3.  Fax to PCP.  4 mm nonobstructing LEFT renal calculus.      Called patient with the above information. She verbalized understanding and knows she will get a call with the appointment information. Report faxed to PCP.    thomas

## 2023-04-25 ENCOUNTER — TELEPHONE (OUTPATIENT)
Dept: ONCOLOGY | Facility: CLINIC | Age: 58
End: 2023-04-25

## 2023-04-25 DIAGNOSIS — C20 RECTAL CANCER: Primary | ICD-10-CM

## 2023-04-25 NOTE — TELEPHONE ENCOUNTER
Caller: CONSTANTINO    Relationship to patient: SELF    Best call back number: [phone]    Patient is needing: TO R/S 23 F/U APPT AND CANNOT COME IN THE WEEK OF 5-15-23 THROUGH 2023. SHE ALSO NEEDS NEW LAB ORDERS SINCE HER ORDERS HAVE .    PT IS ALSO WONDERING IF DR. GOMEZ CAN LOOK AT HER SCANS FROM [facility] OF HER HIP AND LUNG. SHE WANTS TO KNOW DR. GOMEZ'S OPINION.

## 2023-05-16 NOTE — PROGRESS NOTES
[facility] ON[facility] GROUP HEMATOLOGY & ONCOLOGY [place] [id number] [facility] [address]  [facility] [address] [phone]    Patient Name: Teresa Long  Encounter Date: 05/22/2023  YOB: 1965  Patient Number: [id number]      REASON FOR FOLLOW-UP: Ms. Teresa Long is a pleasant 58-year-old  female who is seen on follow-up for Stage IIB (cT2, cN2, M0) rectal cancer.  She had completed 5-FU with mitomycin C and radiation 158.5 months ago.  She is seen alone.  History obtained from the patient.  History is considered reliable.         Oncology/Hematology History Overview Note   DIAGNOSTIC ABNORMALITIES:  The patient had undergone endoscopic ultrasound 11/04/2009. It showed a T3 lower rectal lesion with enlarged lymph node in the perirectal tissue, 10 cm from the anal verge.  PET scan 11/06/2009 showed intense FDG uptake with SUV approximately 8 along the left aspect of the rectum/anus. Left inguinal and bilateral small iliac nodes demonstrated no abnormal uptake. The patient is planned for neoadjuvant chemoradiation.  The patient was seen by Dr. Leonard 11/11/2009. The patient presented with blood in her stool. Colonoscopy showed a 3 cm ulcerated distal rectal mass. Biopsies showed invasive squamous cell carcinoma. The patient also had multiple sessile 1-2 mm polyps between 10 and 15 cm; all were removed. There is a cyst in the liver, benign.   Rectal junction biopsy 03/25/2010 [facility] negative for malignancy.   Bone scan 09/23/2011 negative for metastasis.  CT of the abdomen and pelvis 05/01/2012 showed hepatic cyst. Left renal stone. Thickening of the wall of the urinary bladder.  Flexible sigmoidoscopy by Dr. 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 No evidence of tumor recurrence.   The patient was last seen by Dr. Leonard 07/22/2015. She is in remission. CEA was normal.  Colonoscopy by Dr. Jasiel See 01/13/2016 showed radiation  proctitis. Multiple colon polyps removed. No evidence of tumor recurrence within the rectum.  The patient had undergone flexible sigmoidoscopy 05/02/2018. No evidence of tumor recurrence. Radiation proctitis and internal hemorrhoids were found.     PREVIOUS INTERVENTIONS:   The patient was given mitomycin C with continuous infusion of 5-FU, 11/19/2009 and cycle 2 was given 01/11/2010 by Dr. Leonard.       Rectal cancer   12/30/2019 Initial Diagnosis    Rectal cancer (CMS/HCC)       12/30/2019 Cancer Staged    Staging form: Colon and Rectum, AJCC V7  - Clinical stage from 12/30/2019: Stage IIA (T3, N0, M0) - Signed by Rashaun Antoine MD on 12/30/2019           PAST MEDICAL HISTORY:  ALLERGIES:  Allergies   Allergen Reactions    Desyrel [Trazodone]     Keftab [Cephalexin]     Ultram [Tramadol Hcl]     Paxil [Paroxetine Hcl] Palpitations     CURRENT MEDICATIONS:  Outpatient Encounter Medications as of 5/22/2023   Medication Sig Dispense Refill    ADVAIR DISKUS 100-50 MCG/DOSE DISKUS Daily.      amphetamine-dextroamphetamine XR (ADDERALL XR) 25 MG 24 hr capsule Take 1 capsule by mouth Daily      Blood Glucose Monitoring Suppl (ONE TOUCH ULTRA 2) w/Device kit       carvedilol (COREG) 3.125 MG tablet Take 1 tablet by mouth 2 (Two) Times a Day. 60 tablet 11    cetirizine (zyrTEC) 10 MG tablet Take 1 tablet by mouth Daily.      clotrimazole (LOTRIMIN) 1 % cream Apply 1 application topically to the appropriate area as directed Daily.      cyanocobalamin 1000 MCG/ML injection Inject 1,000 mL into the appropriate muscle as directed by prescriber Every 30 (Thirty) Days.      diazePAM (VALIUM) 10 MG tablet Take 1 tablet by mouth 4 (Four) Times a Day.      fluticasone (FLONASE) 50 MCG/ACT nasal spray 50 sprays into the nostril(s) as directed by provider Daily.      HYDROcodone-acetaminophen (NORCO)  MG per tablet Take  tablets by mouth 4 (Four) Times a Day.      loperamide (IMODIUM) 2 MG capsule Take 1 capsule by mouth  Daily.      meloxicam (MOBIC) 7.5 MG tablet Take 1 tablet by mouth As Needed.      naproxen (NAPROSYN) 500 MG tablet Take 1 tablet by mouth As Needed.      nystatin (MYCOSTATIN) 930719 UNIT/GM cream Apply 10,000 application topically to the appropriate area as directed.      ONE TOUCH ULTRA TEST test strip       ONETOUCH DELICA LANCETS 33G misc       pantoprazole (PROTONIX) 40 MG EC tablet Take 1 tablet by mouth Daily.      PREMARIN 0.625 MG/GM vaginal cream 0.625 application 1 (One) Time Per Week.      sertraline (ZOLOFT) 50 MG tablet Take 1 tablet by mouth Daily.      sucralfate (CARAFATE) 1 G tablet Take 1 tablet by mouth As Needed.      SYNTHROID 25 MCG tablet Take 1,000 tablets by mouth Daily.      triamterene-hydrochlorothiazide (DYAZIDE) 37.5-25 MG per capsule As Needed.      VENTOLIN  (90 BASE) MCG/ACT inhaler As Needed.       No facility-administered encounter medications on file as of 5/22/2023.     ADULT ILLNESSES:  Patient Active Problem List   Diagnosis Code    Chronic edema R60.9    Tobacco abuse Z72.0    GERD (gastroesophageal reflux disease) K21.9    Hypothyroid E03.9    Anxiety F41.9    ADHD (attention deficit hyperactivity disorder) F90.9    Chronic pain G89.29    Essential hypertension I10    Rectal cancer C20     SURGERIES:  Past Surgical History:   Procedure Laterality Date    APPENDECTOMY      DILATATION AND CURETTAGE      Of Uterus x 5    GYNECOLOGIC CRYOSURGERY      Lesion of cervix    TONSILLECTOMY      VENOUS ACCESS DEVICE (PORT) INSERTION AND REMOVAL  03/17/2015    Port implant and taken out     HEALTH MAINTENANCE ITEMS:  Health Maintenance Due   Topic Date Due    MAMMOGRAM  Never done    COLONOSCOPY  Never done    COVID-19 Vaccine (1) Never done    Pneumococcal Vaccine 0-64 (1 - PCV) Never done    ZOSTER VACCINE (1 of 2) Never done    TDAP/TD VACCINES (3 - Td or Tdap) 01/01/2017    HEPATITIS C SCREENING  Never done    ANNUAL PHYSICAL  Never done    PAP SMEAR  Never done       <no  "information>  Last Completed Colonoscopy       This patient has no relevant Health Maintenance data.            There is no immunization history on file for this patient.  Last Completed Mammogram       This patient has no relevant Health Maintenance data.              FAMILY HISTORY:  Family History   Problem Relation Age of Onset    Stroke Mother     Heart failure Mother      SOCIAL HISTORY:  Social History     Socioeconomic History    Marital status: Unknown   Tobacco Use    Smoking status: Every Day     Packs/day: 0.50     Types: Cigarettes    Smokeless tobacco: Never   Substance and Sexual Activity    Alcohol use: Yes     Comment: Occasional    Drug use: No       REVIEW OF SYSTEMS:    Review of Systems   Constitutional:  Positive for fatigue. Negative for chills and fever.   HENT:  Negative for congestion and mouth sores.    Eyes:  Negative for redness and visual disturbance.   Respiratory:  Negative for cough, shortness of breath and wheezing.    Cardiovascular:  Negative for chest pain and palpitations.   Gastrointestinal:  Negative for abdominal pain, nausea and vomiting.   Endocrine: Negative for polydipsia and polyphagia.   Genitourinary:  Negative for difficulty urinating, dysuria and flank pain.   Musculoskeletal:  Negative for gait problem and joint swelling.   Skin:  Positive for pallor.   Allergic/Immunologic: Negative for food allergies.   Neurological:  Negative for dizziness, speech difficulty and weakness.   Hematological:  Negative for adenopathy. Does not bruise/bleed easily.   Psychiatric/Behavioral:  Negative for agitation, confusion and hallucinations.      VITAL SIGNS: /76   Pulse 86   Temp 98.1 °F (36.7 °C)   Resp 18   Ht 167.6 cm (66\")   Wt 51.2 kg (112 lb 12.8 oz)   SpO2 97%   Breastfeeding No   BMI 18.21 kg/m²  Lost 11 pounds. \"It's stress. I am helping my brother, he has cancer.\"  Pain Score    05/22/23 1349   PainSc: 2  Comment: hip pain       PHYSICAL EXAMINATION: "     Physical Exam  Vitals reviewed.   Constitutional:       General: She is not in acute distress.  HENT:      Head: Normocephalic and atraumatic.   Eyes:      General: No scleral icterus.  Cardiovascular:      Rate and Rhythm: Normal rate.   Pulmonary:      Effort: No respiratory distress.      Breath sounds: No wheezing or rales.   Abdominal:      General: Bowel sounds are normal.      Palpations: Abdomen is soft.      Tenderness: There is no abdominal tenderness.   Musculoskeletal:         General: No swelling.      Cervical back: Neck supple.   Skin:     General: Skin is warm.      Coloration: Skin is not pale.   Neurological:      Mental Status: She is alert and oriented to person, place, and time.   Psychiatric:         Mood and Affect: Mood normal.         Behavior: Behavior normal.         Judgment: Judgment normal.       LABS    No results for input(s): HGB, HCT, MCV, WBC, RDW, MPV, PLT, AUTOIGPER, NEUTROABS, LYMPHSABS, MONOSABS, EOSABS, BASOSABS, AUTOIGNUM, NRBC, NEUTRELPCTM, MONOPCT, BASOPCT, ATYLMPCT, ANISOCYTOSIS, GIANTPLTS in the last 08275 hours.    Invalid input(s): LYMPHOCPCT, ABCMORPH    Lab Results - Last 18 Months   Lab Units 04/13/22  0925   CREATININE mg/dL 0.60       No results for input(s): MSPIKE, KAPPALAMB, IGLFLC, URICACID, FREEKAPPAL, CEA, LDH, REFLABREPO in the last 71088 hours.    No results for input(s): IRON, TIBC, LABIRON, FERRITIN, C6KXXGC, TSH, FOLATE in the last 25698 hours.    Invalid input(s): VITB12    Teresa Long reports a pain score of 2.  Given her pain assessment as noted, treatment options were discussed and the following options were decided upon as a follow-up plan to address the patient's pain: continuation of current treatment plan for pain.      ASSESSMENT:  1.   Rectal Cancer, 11/04/2009.  Current Stage: AJCC IIA (cT3, cN0, M0).  Baseline Node Status: Negative by PET.  Tumor [place]: Left aspect of rectum.  Complications of Tumor: Hematochezia.  Treatment status:  "Mitomycin C with CIV 5-FU and radiation 11/19/2009 and 01/11/2010 with radiation. Complete response.  2.   Performance status of 1.  3.   Left pelvic pain. No acute process. Bilateral osteoarthritis 2/23/2023.   4.   Lung nodules, right. Followed by Dr. Trevizo.  \"They are keeping an eye once a year..\"  5.  Tobacco abuse. Per PCP. \"Off an on. I don't count. Stress.\"  6.   Weight loss, per PCP.           PLAN:  1.    Re:  Heme status. WBC 10.4, hemoglobin 14.4, hematocrit 42.6, MCV 92.2 and platelet 386.   2.    Re:  Pre office CMP. Sodium 128 on diuretic.  GFR 99 and sodium 128. Faxed to PCP, diuretic stopped.  3.    Re:  Pre office CEA. 3.8, normal.   4.    Re:  CT abdomen and pelvis report 4/13/2022.  Mild wall thickening of the transverse and descending colon. Findings may represent acute colitis in the appropriate clinical setting.   Diffuse urinary bladder wall thickening with LEFT lateral urinary bladder diverticulum, suggesting chronic bladder dysfunction. Mild diffuse dilatation of the LEFT ureter extending to the ureterovesical junction where there is mild soft tissue thickening and appearance suggestive of a ureterocele. Consider further evaluation with either cystoscopy or CT urogram to exclude an underlying soft tissue mass lesion.  4 mm nonobstructing LEFT renal calculus.  Patient referred to Dr. Jasiel See for colonoscopy and to urology. CT chest 2/17/2023. Negative.  Left hip MRI 2/23/2023.  No acute process. Bilateral osteoarthritis.   5.  Refer to Dr. Jasiel See for colonoscopy, thickening of the transverse and descending colon by CT scan.  6.  Seen by Dr. Shane Campbell for diffuse bladder wall thickening. \"Radiation effect.\"  7.    Re:  Continue surveillance for rectal cancer.  She will be seen every 12 months.  8.   Continue to follow with Dr. Trevizo for lung nodules.  \"I saw him February. I got a new CT scan.\"  9.   Continue ongoing " management per primary care physician and other specialists.  9.   Plan of care discussed with patient.   Understanding expressed.  Patient agreeable to proceed.  10.   Return to office in 12 months with pre office CBC with differential, CMP, and CEA. Imaging as needed.          I have reviewed the assessment and plan and verified the accuracy of it. No changes to assessment and plan since the information was documented. Rashaun Antoine MD 05/22/23         I spent 33 total minutes, face-to-face, caring for Teresa today.  Greater than 50% of this time involved counseling and/or coordination of care as documented within this note regarding the patient's illness(es), pros and cons of various treatment options, instructions and/or risk reduction.                cc: (James Lang MD)        (Jasiel See MD)        (Fred Dorantes MD)        Shane Amaya MD)         MD Annie Woods APRN

## 2023-05-17 ENCOUNTER — TELEPHONE (OUTPATIENT)
Dept: ONCOLOGY | Facility: CLINIC | Age: 58
End: 2023-05-17
Payer: COMMERCIAL

## 2023-05-17 NOTE — TELEPHONE ENCOUNTER
----- Message from Rashaun Antoine MD sent at 5/17/2023  8:56 AM CDT -----  Fax CMP to PCP.  Sodium 128 on diuretic.  GFR 99 and sodium 128.        CEA 3.8, normal.

## 2023-05-22 ENCOUNTER — OFFICE VISIT (OUTPATIENT)
Dept: ONCOLOGY | Facility: CLINIC | Age: 58
End: 2023-05-22
Payer: COMMERCIAL

## 2023-05-22 VITALS
HEART RATE: 86 BPM | DIASTOLIC BLOOD PRESSURE: 76 MMHG | OXYGEN SATURATION: 97 % | BODY MASS INDEX: 18.13 KG/M2 | HEIGHT: 66 IN | SYSTOLIC BLOOD PRESSURE: 152 MMHG | TEMPERATURE: 98.1 F | WEIGHT: 112.8 LBS | RESPIRATION RATE: 18 BRPM

## 2023-05-22 DIAGNOSIS — C20 RECTAL CANCER: Primary | ICD-10-CM

## 2023-05-22 PROCEDURE — 3077F SYST BP >= 140 MM HG: CPT | Performed by: INTERNAL MEDICINE

## 2023-05-22 PROCEDURE — 99214 OFFICE O/P EST MOD 30 MIN: CPT | Performed by: INTERNAL MEDICINE

## 2023-05-22 PROCEDURE — 3078F DIAST BP <80 MM HG: CPT | Performed by: INTERNAL MEDICINE

## 2023-05-22 PROCEDURE — 1125F AMNT PAIN NOTED PAIN PRSNT: CPT | Performed by: INTERNAL MEDICINE

## 2023-05-22 PROCEDURE — 1159F MED LIST DOCD IN RCRD: CPT | Performed by: INTERNAL MEDICINE

## 2023-11-20 ENCOUNTER — TELEPHONE (OUTPATIENT)
Dept: GASTROENTEROLOGY | Age: 58
End: 2023-11-20

## 2023-11-20 NOTE — TELEPHONE ENCOUNTER
Provider is calling  for status of referral  Please return call to update Patient on the referral status. Heather preferred call back time is Anytime.    Thank you!

## 2023-11-20 NOTE — TELEPHONE ENCOUNTER
Michel doesn't have it in her stack and I don't see it in Amandas box.     Routing to Ny to see if it's in her stack of OA's?

## 2023-12-04 NOTE — TELEPHONE ENCOUNTER
They called to check on if we got this referral yet and I still don't have it. I told them to fax it to ENT to see if we can get it down there.     If I remember correct, this referral doesn't have all the pages when it comes through to us.

## 2024-01-09 RX ORDER — SOTALOL HYDROCHLORIDE 80 MG/1
1 TABLET ORAL 2 TIMES DAILY
COMMUNITY

## 2024-01-09 RX ORDER — FLUOROURACIL 50 MG/G
CREAM TOPICAL PRN
COMMUNITY

## 2024-01-09 RX ORDER — TRIAMTERENE AND HYDROCHLOROTHIAZIDE 37.5; 25 MG/1; MG/1
1 CAPSULE ORAL DAILY
COMMUNITY
Start: 2017-02-18

## 2024-01-09 RX ORDER — CYANOCOBALAMIN 1000 UG/ML
1000000 INJECTION, SOLUTION INTRAMUSCULAR; SUBCUTANEOUS
COMMUNITY
Start: 2017-02-18

## 2024-01-09 RX ORDER — FAMOTIDINE 20 MG/1
2 TABLET, FILM COATED ORAL 2 TIMES DAILY
COMMUNITY

## 2024-01-09 RX ORDER — FLUTICASONE PROPIONATE 50 MCG
2 SPRAY, SUSPENSION (ML) NASAL DAILY
COMMUNITY
Start: 2017-02-18

## 2024-01-09 RX ORDER — APIXABAN 5 MG/1
5 TABLET, FILM COATED ORAL 2 TIMES DAILY
COMMUNITY

## 2024-01-09 RX ORDER — DEXTROAMPHETAMINE SULFATE, DEXTROAMPHETAMINE SACCHARATE, AMPHETAMINE SULFATE AND AMPHETAMINE ASPARTATE 6.25; 6.25; 6.25; 6.25 MG/1; MG/1; MG/1; MG/1
25 CAPSULE, EXTENDED RELEASE ORAL EVERY MORNING
COMMUNITY
Start: 2017-02-18

## 2024-01-09 RX ORDER — FLUTICASONE PROPIONATE AND SALMETEROL 500; 50 UG/1; UG/1
1 POWDER RESPIRATORY (INHALATION) 2 TIMES DAILY
COMMUNITY

## 2024-01-09 RX ORDER — HYDROCODONE BITARTRATE AND ACETAMINOPHEN 10; 325 MG/1; MG/1
1 TABLET ORAL EVERY 6 HOURS PRN
COMMUNITY
Start: 2017-02-18

## 2024-01-09 RX ORDER — LOPERAMIDE HYDROCHLORIDE 2 MG/1
2 CAPSULE ORAL PRN
COMMUNITY
Start: 2017-02-11

## 2024-01-09 RX ORDER — LEVOTHYROXINE SODIUM 25 MCG
25 TABLET ORAL DAILY
COMMUNITY
Start: 2017-01-31

## 2024-01-09 RX ORDER — NICOTINE 21 MG/24HR
1 PATCH, TRANSDERMAL 24 HOURS TRANSDERMAL EVERY 24 HOURS
COMMUNITY

## 2024-01-09 RX ORDER — CETIRIZINE HYDROCHLORIDE 10 MG/1
10 TABLET ORAL DAILY
COMMUNITY
Start: 2017-01-19

## 2024-01-09 RX ORDER — PANTOPRAZOLE SODIUM 40 MG/1
1 TABLET, DELAYED RELEASE ORAL DAILY
COMMUNITY
Start: 2017-02-18

## 2024-01-09 RX ORDER — MELOXICAM 15 MG/1
15 TABLET ORAL DAILY
COMMUNITY

## 2024-01-09 RX ORDER — DIAZEPAM 10 MG/1
10 TABLET ORAL EVERY 6 HOURS PRN
COMMUNITY
Start: 2017-02-18

## 2024-01-09 RX ORDER — ALBUTEROL SULFATE 90 UG/1
2 AEROSOL, METERED RESPIRATORY (INHALATION) EVERY 6 HOURS PRN
COMMUNITY
Start: 2017-02-18

## 2024-02-07 ENCOUNTER — TELEPHONE (OUTPATIENT)
Dept: VASCULAR SURGERY | Age: 59
End: 2024-02-07

## 2024-03-05 RX ORDER — TIZANIDINE 2 MG/1
2 TABLET ORAL EVERY 6 HOURS PRN
COMMUNITY

## 2024-03-05 RX ORDER — BLOOD SUGAR DIAGNOSTIC
1 STRIP MISCELLANEOUS DAILY
COMMUNITY
Start: 2024-02-10

## 2024-03-05 RX ORDER — ONDANSETRON 4 MG/1
4 TABLET, FILM COATED ORAL EVERY 8 HOURS PRN
COMMUNITY

## 2024-03-05 RX ORDER — TRIAMCINOLONE ACETONIDE 1 MG/G
CREAM TOPICAL 2 TIMES DAILY
COMMUNITY

## 2024-03-13 ENCOUNTER — OFFICE VISIT (OUTPATIENT)
Dept: VASCULAR SURGERY | Age: 59
End: 2024-03-13
Payer: MEDICAID

## 2024-03-13 VITALS
OXYGEN SATURATION: 99 % | DIASTOLIC BLOOD PRESSURE: 89 MMHG | SYSTOLIC BLOOD PRESSURE: 175 MMHG | TEMPERATURE: 98.5 F | HEART RATE: 105 BPM

## 2024-03-13 DIAGNOSIS — I70.213 ATHEROSCLER OF NATIVE ARTERY OF BOTH LEGS WITH INTERMIT CLAUDICATION (HCC): Primary | ICD-10-CM

## 2024-03-13 DIAGNOSIS — I70.213 ATHEROSCLEROSIS OF NATIVE ARTERY OF BOTH LOWER EXTREMITIES WITH INTERMITTENT CLAUDICATION (HCC): ICD-10-CM

## 2024-03-13 DIAGNOSIS — I70.245 ATHEROSCLEROSIS OF NATIVE ARTERY OF LEFT LOWER EXTREMITY WITH ULCERATION OF OTHER PART OF FOOT (HCC): ICD-10-CM

## 2024-03-13 DIAGNOSIS — I70.213 ATHEROSCLEROSIS OF NATIVE ARTERY OF BOTH LOWER EXTREMITIES WITH INTERMITTENT CLAUDICATION (HCC): Primary | ICD-10-CM

## 2024-03-13 LAB
ALBUMIN SERPL-MCNC: 4.3 G/DL (ref 3.5–5.2)
ALP SERPL-CCNC: 81 U/L (ref 35–104)
ALT SERPL-CCNC: 16 U/L (ref 5–33)
ANION GAP SERPL CALCULATED.3IONS-SCNC: 12 MMOL/L (ref 7–19)
AST SERPL-CCNC: 18 U/L (ref 5–32)
BILIRUB SERPL-MCNC: 0.4 MG/DL (ref 0.2–1.2)
BNP BLD-MCNC: 152 PG/ML (ref 0–124)
BUN SERPL-MCNC: 8 MG/DL (ref 8–23)
CALCIUM SERPL-MCNC: 9.8 MG/DL (ref 8.8–10.2)
CHLORIDE SERPL-SCNC: 98 MMOL/L (ref 98–111)
CO2 SERPL-SCNC: 27 MMOL/L (ref 22–29)
CREAT SERPL-MCNC: 0.5 MG/DL (ref 0.5–0.9)
GLUCOSE SERPL-MCNC: 110 MG/DL (ref 74–109)
POTASSIUM SERPL-SCNC: 4.2 MMOL/L (ref 3.5–5)
PROT SERPL-MCNC: 7.4 G/DL (ref 6.6–8.7)
SODIUM SERPL-SCNC: 137 MMOL/L (ref 136–145)

## 2024-03-13 PROCEDURE — G8421 BMI NOT CALCULATED: HCPCS | Performed by: NURSE PRACTITIONER

## 2024-03-13 PROCEDURE — 3017F COLORECTAL CA SCREEN DOC REV: CPT | Performed by: NURSE PRACTITIONER

## 2024-03-13 PROCEDURE — 4004F PT TOBACCO SCREEN RCVD TLK: CPT | Performed by: NURSE PRACTITIONER

## 2024-03-13 PROCEDURE — G8427 DOCREV CUR MEDS BY ELIG CLIN: HCPCS | Performed by: NURSE PRACTITIONER

## 2024-03-13 PROCEDURE — G8484 FLU IMMUNIZE NO ADMIN: HCPCS | Performed by: NURSE PRACTITIONER

## 2024-03-13 PROCEDURE — 99203 OFFICE O/P NEW LOW 30 MIN: CPT | Performed by: NURSE PRACTITIONER

## 2024-03-13 RX ORDER — BUPROPION HYDROCHLORIDE 150 MG/1
150 TABLET, EXTENDED RELEASE ORAL 2 TIMES DAILY
COMMUNITY
Start: 2024-02-20

## 2024-03-13 NOTE — PROGRESS NOTES
Heather Perry (:  1965) is a 59 y.o. female,New patient, here for evaluation of the following chief complaint(s):  New Patient (blister on toe ref by dr odonnell/will not come in early only wanted a late appt /)            SUBJECTIVE/OBJECTIVE:  Heather has a history of peripheral vascular disease of the lower extremities.  She has had this for 1 - 5 years. Current treatment includes eliquis.  Heather has not had new wounds. Recently, she reports claudication at a distance of  which varies.  Heather reports that the right leg is equal to the left.  She reports claudication is not changed and is mostly in the form of generalized weakness starting in the calves. She has a short recovery time. This is reproducible in nature. She reports ischemic rest pain 0 times per night.  She reports walking with cart does not help.  Wound has been present since winter    I have personally reviewed the following: problem list, current meds, allergies, PMH, PSH, family hx, and social hx  Heather Perry is a 59 y.o. female with the following history as recorded in [facility]:  There are no problems to display for this patient.    Current Outpatient Medications   Medication Sig Dispense Refill    buPROPion (WELLBUTRIN SR) 150 MG extended release tablet Take 1 tablet by mouth 2 times daily      ONETOUCH ULTRA strip 1 each daily      brompheniramine-pseudoephedrine 1-15 MG/5ML ELIX elixir Take 5 mLs by mouth every 6 hours as needed      Multiple Vitamin (MULTIVITAMIN ADULT PO) Take by mouth      ondansetron (ZOFRAN) 4 MG tablet Take 1 tablet by mouth every 8 hours as needed for Nausea or Vomiting      tiZANidine (ZANAFLEX) 2 MG tablet Take 1 tablet by mouth every 6 hours as needed      triamcinolone (KENALOG) 0.1 % cream Apply topically 2 times daily Apply topically 2 times daily.      ADDERALL XR 25 MG capsule Take 1 capsule by mouth every morning.      fluticasone-salmeterol (ADVAIR DISKUS) 500-50 MCG/ACT AEPB diskus inhaler Inhale 1

## 2024-03-14 RX ORDER — CLONIDINE HYDROCHLORIDE 0.1 MG/1
0.1 TABLET ORAL PRN
OUTPATIENT
Start: 2024-03-14

## 2024-03-14 RX ORDER — ASPIRIN 81 MG/1
81 TABLET ORAL ONCE
OUTPATIENT
Start: 2024-03-14 | End: 2024-03-14

## 2024-03-14 RX ORDER — SODIUM CHLORIDE 9 MG/ML
INJECTION, SOLUTION INTRAVENOUS CONTINUOUS
OUTPATIENT
Start: 2024-03-14

## 2024-03-14 RX ORDER — SODIUM CHLORIDE 0.9 % (FLUSH) 0.9 %
5-40 SYRINGE (ML) INJECTION EVERY 12 HOURS SCHEDULED
OUTPATIENT
Start: 2024-03-14

## 2024-03-14 RX ORDER — SODIUM CHLORIDE 0.9 % (FLUSH) 0.9 %
5-40 SYRINGE (ML) INJECTION PRN
OUTPATIENT
Start: 2024-03-14

## 2024-03-14 RX ORDER — SODIUM CHLORIDE 9 MG/ML
INJECTION, SOLUTION INTRAVENOUS PRN
OUTPATIENT
Start: 2024-03-14

## 2024-03-14 NOTE — H&P
Heather Perry (:  1965) is a 59 y.o. female,New patient, here for evaluation of the following chief complaint(s):  New Patient (blister on toe ref by dr odonnell/will not come in early only wanted a late appt /)            SUBJECTIVE/OBJECTIVE:  Heather has a history of peripheral vascular disease of the lower extremities.  She has had this for 1 - 5 years. Current treatment includes eliquis.  Heather has not had new wounds. Recently, she reports claudication at a distance of  which varies.  Heather reports that the right leg is equal to the left.  She reports claudication is not changed and is mostly in the form of generalized weakness starting in the calves. She has a short recovery time. This is reproducible in nature. She reports ischemic rest pain 0 times per night.  She reports walking with cart does not help.  Wound has been present since winter    I have personally reviewed the following: problem list, current meds, allergies, PMH, PSH, family hx, and social hx  Heather Perry is a 59 y.o. female with the following history as recorded in [facility]:  There are no problems to display for this patient.    Current Outpatient Medications   Medication Sig Dispense Refill    buPROPion (WELLBUTRIN SR) 150 MG extended release tablet Take 1 tablet by mouth 2 times daily      ONETOUCH ULTRA strip 1 each daily      brompheniramine-pseudoephedrine 1-15 MG/5ML ELIX elixir Take 5 mLs by mouth every 6 hours as needed      Multiple Vitamin (MULTIVITAMIN ADULT PO) Take by mouth      ondansetron (ZOFRAN) 4 MG tablet Take 1 tablet by mouth every 8 hours as needed for Nausea or Vomiting      tiZANidine (ZANAFLEX) 2 MG tablet Take 1 tablet by mouth every 6 hours as needed      triamcinolone (KENALOG) 0.1 % cream Apply topically 2 times daily Apply topically 2 times daily.      ADDERALL XR 25 MG capsule Take 1 capsule by mouth every morning.      fluticasone-salmeterol (ADVAIR DISKUS) 500-50 MCG/ACT AEPB diskus inhaler Inhale 1

## 2024-03-15 ENCOUNTER — TELEPHONE (OUTPATIENT)
Dept: VASCULAR SURGERY | Age: 59
End: 2024-03-15

## 2024-03-15 NOTE — TELEPHONE ENCOUNTER
Left a message for the patient to go over the following instructions with her.  I left our office number to return my phone call.           Heather Perry    Arteriogram Instructions    Report to the [facility] at [facility] (go in the front door and to the left) on Friday, 04/19/2024 @ 6:00 AM.   Nothing to eat or drink after midnight the night before the procedure.  Please take all medications as normally scheduled to take unless listed below with a sip of water.  Do not take Eliquis the day before the procedure or the morning of the procedure.  Do not take triamterene the morning of the procedure.   If you have sleep apnea and require C-PAP, please bring this with you to the hospital.  Bring a list of all of your allergies and medications with you to the hospital.  Please let our nurse know if you have had an allergy to iodine, shellfish, or x-ray dye.  Let the nurse know if you take any of the following:  Over the counter herbal supplements  Diclofenec, indomethacin, ketoprofen, Caridopa/levadopa, naproxen, sulindac, piroxicam, glucosamine, Chondrotin, cocchine, or methotrexate.  Plan to stay at the hospital for 4 - 6 hours before being released  by the physician. You will need someone to drive you home after the procedure.  10. Other Directions: For any questions or concerns please contact our office @         [phone] and ask to speak to Beronica.   11.  You will need a  to take you home.  12.  Follow up appt: 05/02/2024 @ 9:30 AM with Lisa.     Unless instructed otherwise by your physician, cleanse incision/puncture site twice daily with soap and water.  Apply dry gauze. Do not get in tub.  Okay to shower.  Do not apply any salve, cream, peroxide or alcohol to the incision.  Call with any increasing redness or drainage

## 2024-04-11 ENCOUNTER — TELEPHONE (OUTPATIENT)
Dept: VASCULAR SURGERY | Age: 59
End: 2024-04-11

## 2024-04-11 NOTE — TELEPHONE ENCOUNTER
Called and spoke to the patient to answer her questions regarding angiogram.  The patient voiced understanding.

## 2024-04-19 ENCOUNTER — HOSPITAL ENCOUNTER (OUTPATIENT)
Dept: INTERVENTIONAL RADIOLOGY/VASCULAR | Age: 59
Discharge: HOME | End: 2024-04-19
Payer: MEDICAID

## 2024-04-19 VITALS
RESPIRATION RATE: 13 BRPM | HEIGHT: 66 IN | SYSTOLIC BLOOD PRESSURE: 123 MMHG | TEMPERATURE: 97.4 F | WEIGHT: 106 LBS | DIASTOLIC BLOOD PRESSURE: 63 MMHG | OXYGEN SATURATION: 97 % | BODY MASS INDEX: 17.04 KG/M2 | HEART RATE: 79 BPM

## 2024-04-19 DIAGNOSIS — I73.9 PVD (PERIPHERAL VASCULAR DISEASE) (HCC): ICD-10-CM

## 2024-04-19 LAB
ANION GAP SERPL CALCULATED.3IONS-SCNC: 9 MMOL/L (ref 7–19)
BUN SERPL-MCNC: 10 MG/DL (ref 8–23)
CALCIUM SERPL-MCNC: 9.7 MG/DL (ref 8.8–10.2)
CHLORIDE SERPL-SCNC: 99 MMOL/L (ref 98–111)
CO2 SERPL-SCNC: 31 MMOL/L (ref 22–29)
CREAT SERPL-MCNC: 0.5 MG/DL (ref 0.5–0.9)
ERYTHROCYTE [DISTWIDTH] IN BLOOD BY AUTOMATED COUNT: 13.6 % (ref 11.5–14.5)
GLUCOSE SERPL-MCNC: 84 MG/DL (ref 74–109)
HCT VFR BLD AUTO: 43.2 % (ref 37–47)
HGB BLD-MCNC: 14.5 G/DL (ref 12–16)
MCH RBC QN AUTO: 31.3 PG (ref 27–31)
MCHC RBC AUTO-ENTMCNC: 33.6 G/DL (ref 33–37)
MCV RBC AUTO: 93.3 FL (ref 81–99)
PLATELET # BLD AUTO: 367 K/UL (ref 130–400)
PMV BLD AUTO: 8.5 FL (ref 9.4–12.3)
POTASSIUM SERPL-SCNC: 3.6 MMOL/L (ref 3.5–5)
RBC # BLD AUTO: 4.63 M/UL (ref 4.2–5.4)
SODIUM SERPL-SCNC: 139 MMOL/L (ref 136–145)
WBC # BLD AUTO: 5.6 K/UL (ref 4.8–10.8)

## 2024-04-19 PROCEDURE — 75625 CONTRAST EXAM ABDOMINL AORTA: CPT

## 2024-04-19 PROCEDURE — 36200 PLACE CATHETER IN AORTA: CPT

## 2024-04-19 PROCEDURE — 80061 LIPID PANEL: CPT

## 2024-04-19 PROCEDURE — 85027 COMPLETE CBC AUTOMATED: CPT

## 2024-04-19 PROCEDURE — 2580000003 HC RX 258: Performed by: NURSE PRACTITIONER

## 2024-04-19 PROCEDURE — 75716 ARTERY X-RAYS ARMS/LEGS: CPT

## 2024-04-19 PROCEDURE — 2709999900 IR ANGIOGRAM EXTREMITY BILATERAL

## 2024-04-19 PROCEDURE — 6360000004 HC RX CONTRAST MEDICATION: Performed by: SURGERY

## 2024-04-19 PROCEDURE — 36415 COLL VENOUS BLD VENIPUNCTURE: CPT

## 2024-04-19 PROCEDURE — 99153 MOD SED SAME PHYS/QHP EA: CPT

## 2024-04-19 PROCEDURE — 99152 MOD SED SAME PHYS/QHP 5/>YRS: CPT

## 2024-04-19 PROCEDURE — 6360000002 HC RX W HCPCS: Performed by: NURSE PRACTITIONER

## 2024-04-19 PROCEDURE — 6370000000 HC RX 637 (ALT 250 FOR IP): Performed by: NURSE PRACTITIONER

## 2024-04-19 PROCEDURE — 6360000002 HC RX W HCPCS: Performed by: SURGERY

## 2024-04-19 PROCEDURE — 2500000003 HC RX 250 WO HCPCS: Performed by: SURGERY

## 2024-04-19 PROCEDURE — 80048 BASIC METABOLIC PNL TOTAL CA: CPT

## 2024-04-19 RX ORDER — SODIUM CHLORIDE 9 MG/ML
INJECTION, SOLUTION INTRAVENOUS PRN
Status: DISCONTINUED | OUTPATIENT
Start: 2024-04-19 | End: 2024-04-21 | Stop reason: HOSPADM

## 2024-04-19 RX ORDER — SODIUM CHLORIDE 9 MG/ML
INJECTION, SOLUTION INTRAVENOUS CONTINUOUS
Status: DISCONTINUED | OUTPATIENT
Start: 2024-04-19 | End: 2024-04-21 | Stop reason: HOSPADM

## 2024-04-19 RX ORDER — LIDOCAINE HYDROCHLORIDE 20 MG/ML
INJECTION, SOLUTION EPIDURAL; INFILTRATION; INTRACAUDAL; PERINEURAL PRN
Status: COMPLETED | OUTPATIENT
Start: 2024-04-19 | End: 2024-04-19

## 2024-04-19 RX ORDER — IODIXANOL 320 MG/ML
INJECTION, SOLUTION INTRAVASCULAR PRN
Status: COMPLETED | OUTPATIENT
Start: 2024-04-19 | End: 2024-04-19

## 2024-04-19 RX ORDER — FENTANYL CITRATE 50 UG/ML
INJECTION, SOLUTION INTRAMUSCULAR; INTRAVENOUS PRN
Status: COMPLETED | OUTPATIENT
Start: 2024-04-19 | End: 2024-04-19

## 2024-04-19 RX ORDER — SODIUM CHLORIDE 0.9 % (FLUSH) 0.9 %
5-40 SYRINGE (ML) INJECTION PRN
Status: DISCONTINUED | OUTPATIENT
Start: 2024-04-19 | End: 2024-04-21 | Stop reason: HOSPADM

## 2024-04-19 RX ORDER — CLONIDINE HYDROCHLORIDE 0.1 MG/1
0.1 TABLET ORAL PRN
Status: DISCONTINUED | OUTPATIENT
Start: 2024-04-19 | End: 2024-04-21 | Stop reason: HOSPADM

## 2024-04-19 RX ORDER — SODIUM CHLORIDE 0.9 % (FLUSH) 0.9 %
5-40 SYRINGE (ML) INJECTION EVERY 12 HOURS SCHEDULED
Status: DISCONTINUED | OUTPATIENT
Start: 2024-04-19 | End: 2024-04-21 | Stop reason: HOSPADM

## 2024-04-19 RX ORDER — MIDAZOLAM HYDROCHLORIDE 1 MG/ML
INJECTION INTRAMUSCULAR; INTRAVENOUS PRN
Status: COMPLETED | OUTPATIENT
Start: 2024-04-19 | End: 2024-04-19

## 2024-04-19 RX ORDER — SODIUM CHLORIDE 9 MG/ML
INJECTION, SOLUTION INTRAVENOUS PRN
Status: DISCONTINUED | OUTPATIENT
Start: 2024-04-19 | End: 2024-04-19 | Stop reason: SDUPTHER

## 2024-04-19 RX ORDER — HEPARIN SODIUM 5000 [USP'U]/ML
INJECTION, SOLUTION INTRAVENOUS; SUBCUTANEOUS PRN
Status: COMPLETED | OUTPATIENT
Start: 2024-04-19 | End: 2024-04-19

## 2024-04-19 RX ORDER — ASPIRIN 81 MG/1
81 TABLET ORAL ONCE
Status: COMPLETED | OUTPATIENT
Start: 2024-04-19 | End: 2024-04-19

## 2024-04-19 RX ADMIN — ASPIRIN 81 MG: 81 TABLET, COATED ORAL at 07:22

## 2024-04-19 RX ADMIN — FENTANYL CITRATE 25 MCG: 50 INJECTION, SOLUTION INTRAMUSCULAR; INTRAVENOUS at 08:25

## 2024-04-19 RX ADMIN — MIDAZOLAM 1 MG: 1 INJECTION INTRAMUSCULAR; INTRAVENOUS at 08:25

## 2024-04-19 RX ADMIN — LIDOCAINE HYDROCHLORIDE 10 ML: 20 INJECTION, SOLUTION EPIDURAL; INFILTRATION; INTRACAUDAL; PERINEURAL at 08:21

## 2024-04-19 RX ADMIN — Medication 1000 MG: at 08:19

## 2024-04-19 RX ADMIN — SODIUM CHLORIDE: 9 INJECTION, SOLUTION INTRAVENOUS at 07:18

## 2024-04-19 RX ADMIN — IODIXANOL 120 ML: 320 INJECTION, SOLUTION INTRAVASCULAR at 08:43

## 2024-04-19 RX ADMIN — HEPARIN SODIUM 5000 UNITS: 5000 INJECTION INTRAVENOUS; SUBCUTANEOUS at 08:21

## 2024-04-19 NOTE — PROGRESS NOTES
Returned post arteriogram.  Awake and alert.  Puncture site to right groin clear with gauze and tegaderm dressing in place.  Denies any c/o pain.  Friend at bedside.

## 2024-04-19 NOTE — OP NOTE
Patient Name: Heather Perry   YOB: 1965   MRN: [id number]     Procedure Date: 4/19/2024     Pre Op Diagnosis: PAD with left foot wound    Post Op Diagnosis: same    Procedure: Aortogram with bilateral lower extremity runoff    Anesthesia: Local with Moderate Sedation    Estimated Blood Loss: Less than 50 ml    Complications: None    Implants: none    Procedure Details: Patient was brought to the angiogram suite and placed in supine position.  Antibiotics were given.  Bilateral groins were prepped and draped in sterile fashion.  Timeout performed.  Right common femoral artery was accessed under continuous ultrasound guidance using sterile micropuncture technique.  5 Eston[place] glide sheath was inserted.  J-wire was floated proximally followed by Omni Flush catheter.  Using power injection aortogram with bilateral lower extremity runoff were performed as mentioned below findings.  Devices were removed and the access site was sealed using 5 [place] Mynx.  Patient tolerated procedure well and discharged to PACU in stable condition.    Findings: Aorta normal caliber.  Left renal artery patent, right renal artery with stenosis.  Bilateral common iliac, internal iliac and external iliac arteries patent.  Bilateral common femoral arteries patent, profunda femoris patent, SFA is patent, popliteal arteries patent with three-vessel runoff bilaterally.    Disposition:aroused from sedation, and taken to the recovery room in a stable condition    Soraya Canales DO  4/19/2024 9:05 AM

## 2024-04-19 NOTE — H&P
APPENDECTOMY      COLONOSCOPY      DILATION AND CURETTAGE OF UTERUS      5x    TONSILLECTOMY       Family History   Problem Relation Age of Onset    Heart Failure Mother     Stroke Mother      Social History     Tobacco Use    Smoking status: Every Day     Current packs/day: 0.50     Average packs/day: 0.5 packs/day for 30.2 years (15.1 ttl pk-yrs)     Types: Cigarettes     Start date: 1994    Smokeless tobacco: Never   Substance Use Topics    Alcohol use: Yes     Comment: occ         ROS  Eyes - no sudden vision change or amaurosis.  Respiratory - no significant shortness of breath,  Cardiovascular - no chest pain or syncope.  No  significant leg swelling.  No claudication.  Musculoskeletal - no gait disturbance  Skin -has new wound.  Neurologic -  No speech difficulty or lateralizing weakness.  All other review of systems are negative.    Physical Exam    BP (!) 175/89 (Site: Right Upper Arm, Position: Sitting, Cuff Size: Medium Adult)   Pulse (!) 105   Temp 98.5 °F (36.9 °C)   SpO2 99%       Neck- carotid pulses 2+ to palpation with no bruit  Cardiovascular - Regular rate and rhythm.    Pulmonary - effort appears normal.  No respiratory distress.    Lungs - Breath sounds normal. No wheezes or rales.    Extremities -  Radial and brachial pulses are 2+ to palpation bilaterally.  Right femoral pulse: present 2+; Right popliteal pulse: absent Right DP: absent; Right PT absent; Left femoral pulse: present 2+; Left popliteal pulse: absent; Left DP: absent; Left PT: absent No cyanosis, clubbing, or significant edema.  No signs atheroembolic event.  Neurologic - alert and oriented X 3.  Physiologic.   Face symmetric.  Skin - left second distal - gray soft tissue; plantar surface eschar  Psychiatric - mood, affect, and behavior appear normal.  Judgment and thought processes appear normal.    Risk factors for atherosclerosis of all vascular beds have been reviewed with the patient including:  Family history, tobacco

## 2024-04-19 NOTE — INTERVAL H&P NOTE
Update History & Physical    The patient's History and Physical of April 19, 2024 was reviewed with the patient and I examined the patient. There was no change. The surgical site was confirmed by the patient and me.     Plan: The risks, benefits, expected outcome, and alternative to the recommended procedure have been discussed with the patient. Patient understands and wants to proceed with the procedure.   Moderate sedation  ASA III, Mallampati 3    Electronically signed by Soraya Canales DO on 4/19/2024 at 8:13 AM

## 2024-04-19 NOTE — PROGRESS NOTES
Patient and friend instructed on care of right groin site post arteriogram.  Given written instructions.  Both stated understanding.

## 2024-04-19 NOTE — DISCHARGE INSTRUCTIONS
1. Keep dressing in place to groin site for 24 hours.  2 Okay to shower after 24 hours.  3.Apply bandaid to site daily for 3 days then remove and leave off.  4.Monitor site for any sign of infection. Notify MD if this occurs.  5.Rest until morning up to bathroom only.   6.Do not drive for 24 hours.  7.Drink one 8-10 oz glass of non-alcoholic liquid every one hour until bedtime.  8.Check the site after each activity for bleeding or swelling.  9.If bleeding occurs, apply pressure to site and call 911 or rush to nearest emergency room.  10.Keep affected leg straight until bedtime doing leg exercises to encourage blood flow, (Try wiggling your toes and rotating your ankle in circles)  11.Resume normal ( non-strenuous) activity tomorrow.  12.Bruising and soreness at the puncture site may occur.  This will heal and clear after several weeks.      SEE [employer] CLOSURE BOOKLET FOR ADDITIONAL INSTRUCTIONS

## 2024-04-20 LAB
CHOLEST SERPL-MCNC: 174 MG/DL (ref 160–199)
HDLC SERPL-MCNC: 76 MG/DL (ref 65–121)
LDLC SERPL CALC-MCNC: 76 MG/DL
TRIGL SERPL-MCNC: 108 MG/DL (ref 0–149)

## 2024-05-02 ENCOUNTER — OFFICE VISIT (OUTPATIENT)
Dept: VASCULAR SURGERY | Age: 59
End: 2024-05-02
Payer: MEDICAID

## 2024-05-02 VITALS
TEMPERATURE: 97.4 F | DIASTOLIC BLOOD PRESSURE: 80 MMHG | OXYGEN SATURATION: 96 % | HEART RATE: 83 BPM | SYSTOLIC BLOOD PRESSURE: 151 MMHG

## 2024-05-02 DIAGNOSIS — I73.9 PVD (PERIPHERAL VASCULAR DISEASE) (HCC): Primary | ICD-10-CM

## 2024-05-02 PROCEDURE — 99213 OFFICE O/P EST LOW 20 MIN: CPT | Performed by: NURSE PRACTITIONER

## 2024-05-02 PROCEDURE — G8419 CALC BMI OUT NRM PARAM NOF/U: HCPCS | Performed by: NURSE PRACTITIONER

## 2024-05-02 PROCEDURE — G8427 DOCREV CUR MEDS BY ELIG CLIN: HCPCS | Performed by: NURSE PRACTITIONER

## 2024-05-02 PROCEDURE — 3017F COLORECTAL CA SCREEN DOC REV: CPT | Performed by: NURSE PRACTITIONER

## 2024-05-02 PROCEDURE — 4004F PT TOBACCO SCREEN RCVD TLK: CPT | Performed by: NURSE PRACTITIONER

## 2024-05-02 NOTE — PROGRESS NOTES
Heather Perry (:  1965) is a 59 y.o. female,Established patient, here for evaluation of the following chief complaint(s):  Follow-up            SUBJECTIVE/OBJECTIVE:  Patient presents for follow up after an arteriogram done 2 weeks ago.  Procedure was done for peripheral vascular disease with wound. Post op problems include none.  Angiogram complications were none.  Post op pain and swelling are minimal.      I have personally reviewed the following: problem list, current meds, allergies, PMH, PSH, family hx, and social hx  Heather Perry is a 59 y.o. female with the following history as recorded in [facility]:  Patient Active Problem List    Diagnosis Date Noted    PVD (peripheral vascular disease) ([facility]) 2024     Current Outpatient Medications   Medication Sig Dispense Refill    buPROPion (WELLBUTRIN SR) 150 MG extended release tablet Take 1 tablet by mouth 2 times daily      ONETOUCH ULTRA strip 1 each daily      brompheniramine-pseudoephedrine 1-15 MG/5ML ELIX elixir Take 5 mLs by mouth every 6 hours as needed      Multiple Vitamin (MULTIVITAMIN ADULT PO) Take by mouth daily      ondansetron (ZOFRAN) 4 MG tablet Take 1 tablet by mouth every 8 hours as needed for Nausea or Vomiting      tiZANidine (ZANAFLEX) 2 MG tablet Take 1 tablet by mouth every 6 hours as needed      triamcinolone (KENALOG) 0.1 % cream Apply topically as needed Apply topically 2 times daily.      ADDERALL XR 25 MG capsule Take 1 capsule by mouth every morning.      fluticasone-salmeterol (ADVAIR DISKUS) 500-50 MCG/ACT AEPB diskus inhaler Inhale 1 puff into the lungs once      albuterol sulfate HFA (PROVENTIL;VENTOLIN;PROAIR) 108 (90 Base) MCG/ACT inhaler Inhale 2 puffs into the lungs every 6 hours as needed for Wheezing or Shortness of Breath      cetirizine (ZYRTEC) 10 MG tablet Take 1 tablet by mouth daily      cyanocobalamin 1000 MCG/ML injection Inject 1,000 mLs into the muscle every 30 days      diazePAM (VALIUM) 10 MG tablet

## 2024-05-23 ENCOUNTER — TELEPHONE (OUTPATIENT)
Dept: ONCOLOGY | Facility: CLINIC | Age: 59
End: 2024-05-23
Payer: COMMERCIAL

## 2024-05-23 DIAGNOSIS — C20 RECTAL CANCER: Primary | ICD-10-CM

## 2024-05-23 NOTE — TELEPHONE ENCOUNTER
Caller: Teresa Long    Relationship: Self    Best call back number: [phone]    What is the best time to reach you: ANY    Who are you requesting to speak with (clinical staff, provider,  specific staff member): CLINICAL       What was the call regarding: TERESA [place] [facility] [place] APPOINTMENT WITH DR GOMEZ ON 5-30    PLEASE FAX TO # [phone]    PHONE # [phone]    PLEASE ADVISE

## 2024-06-13 NOTE — PROGRESS NOTES
[facility]  [address]  [place] [id number] [phone]    Patient Name: Teresa Long  Encounter Date: 06/24/2024  YOB: 1965  Patient Number: [id number]      REASON FOR FOLLOW-UP: Ms. Teresa Long is a pleasant 59-year-old  female who is seen on follow-up for Stage IIB (cT2, cN2, M0) anal cancer.  She had completed 5-FU with mitomycin C and radiation on 1/11/2010.  She is seen alone.  History obtained from the patient.  The patient is a reliable historian.           Oncology/Hematology History Overview Note   DIAGNOSTIC ABNORMALITIES:  The patient had undergone endoscopic ultrasound 11/04/2009. It showed a T3 lower rectal lesion with enlarged lymph node in the perirectal tissue, 10 cm from the anal verge.  PET scan 11/06/2009 showed intense FDG uptake with SUV approximately 8 along the left aspect of the rectum/anus. Left inguinal and bilateral small iliac nodes demonstrated no abnormal uptake. The patient is planned for neoadjuvant chemoradiation.  The patient was seen by Dr. Leonard 11/11/2009. The patient presented with blood in her stool. Colonoscopy showed a 3 cm ulcerated distal rectal mass. Biopsies showed invasive squamous cell carcinoma. The patient also had multiple sessile 1-2 mm polyps between 10 and 15 cm; all were removed. There is a cyst in the liver, benign.   Rectal junction biopsy 03/25/2010 [facility] negative for malignancy.   Bone scan 09/23/2011 negative for metastasis.  CT of the abdomen and pelvis 05/01/2012 showed hepatic cyst. Left renal stone. Thickening of the wall of the urinary bladder.  Flexible sigmoidoscopy by Dr. 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 No evidence of tumor recurrence.   The patient was last seen by Dr. Leonard 07/22/2015. She is in remission. CEA was normal.  Colonoscopy by Dr. Jasiel See 01/13/2016 showed radiation  proctitis. Multiple colon polyps removed. No evidence of tumor recurrence within the rectum.  The patient had undergone flexible sigmoidoscopy 05/02/2018. No evidence of tumor recurrence. Radiation proctitis and internal hemorrhoids were found.     PREVIOUS INTERVENTIONS:   The patient was given mitomycin C with continuous infusion of 5-FU, 11/19/2009 and cycle 2 was given 01/11/2010 by Dr. Leonard.       Rectal cancer   12/30/2019 Initial Diagnosis    Rectal cancer (CMS/HCC)     12/30/2019 Cancer Staged    Staging form: Colon and Rectum, AJCC V7  - Clinical stage from 12/30/2019: Stage IIA (T3, N0, M0) - Signed by Rashaun Antoine MD on 12/30/2019         PAST MEDICAL HISTORY:  ALLERGIES:  Allergies   Allergen Reactions    Desyrel [Trazodone]     Keftab [Cephalexin]     Ultram [Tramadol Hcl]     Paxil [Paroxetine Hcl] Palpitations     CURRENT MEDICATIONS:  Outpatient Encounter Medications as of 6/24/2024   Medication Sig Dispense Refill    ADVAIR DISKUS 100-50 MCG/DOSE DISKUS Daily.      amphetamine-dextroamphetamine XR (ADDERALL XR) 25 MG 24 hr capsule Take 1 capsule by mouth Daily      Blood Glucose Monitoring Suppl (ONE TOUCH ULTRA 2) w/Device kit       carvedilol (COREG) 3.125 MG tablet Take 1 tablet by mouth 2 (Two) Times a Day. 60 tablet 11    cetirizine (zyrTEC) 10 MG tablet Take 1 tablet by mouth Daily.      clotrimazole (LOTRIMIN) 1 % cream Apply 1 Application topically to the appropriate area as directed Daily.      cyanocobalamin 1000 MCG/ML injection Inject 1,000 mL into the appropriate muscle as directed by prescriber Every 30 (Thirty) Days.      diazePAM (VALIUM) 10 MG tablet Take 1 tablet by mouth 4 (Four) Times a Day.      Eliquis 5 MG tablet tablet Take 1 tablet by mouth 2 (Two) Times a Day.      fluticasone (FLONASE) 50 MCG/ACT nasal spray 50 sprays into the nostril(s) as directed by provider Daily.      HYDROcodone-acetaminophen (NORCO)  MG per tablet Take  tablets by mouth 4 (Four)  Times a Day.      loperamide (IMODIUM) 2 MG capsule Take 1 capsule by mouth Daily.      naproxen (NAPROSYN) 500 MG tablet Take 1 tablet by mouth As Needed.      nystatin (MYCOSTATIN) 082503 UNIT/GM cream Apply 10,000 Applications topically to the appropriate area as directed.      ONE TOUCH ULTRA TEST test strip       ONETOUCH DELICA LANCETS 33G misc       pantoprazole (PROTONIX) 40 MG EC tablet Take 1 tablet by mouth Daily.      PREMARIN 0.625 MG/GM vaginal cream 0.625 application 1 (One) Time Per Week.      sertraline (ZOLOFT) 50 MG tablet Take 1 tablet by mouth Daily.      sucralfate (CARAFATE) 1 G tablet Take 1 tablet by mouth As Needed.      SYNTHROID 25 MCG tablet Take 1,000 tablets by mouth Daily.      triamterene-hydrochlorothiazide (DYAZIDE) 37.5-25 MG per capsule As Needed.      VENTOLIN  (90 BASE) MCG/ACT inhaler As Needed.      meloxicam (MOBIC) 7.5 MG tablet Take 1 tablet by mouth As Needed. (Patient not taking: Reported on 6/24/2024)       No facility-administered encounter medications on file as of 6/24/2024.     ADULT ILLNESSES:  Patient Active Problem List   Diagnosis Code    Chronic edema R60.9    Tobacco abuse Z72.0    GERD (gastroesophageal reflux disease) K21.9    Hypothyroid E03.9    Anxiety F41.9    ADHD (attention deficit hyperactivity disorder) F90.9    Chronic pain G89.29    Essential hypertension I10    Rectal cancer C20     SURGERIES:  Past Surgical History:   Procedure Laterality Date    APPENDECTOMY      DILATATION AND CURETTAGE      Of Uterus x 5    GYNECOLOGIC CRYOSURGERY      Lesion of cervix    TONSILLECTOMY      VENOUS ACCESS DEVICE (PORT) INSERTION AND REMOVAL  03/17/2015    Port implant and taken out     HEALTH MAINTENANCE ITEMS:  Health Maintenance Due   Topic Date Due    MAMMOGRAM  Never done    COLONOSCOPY  Never done    BMI FOLLOWUP  Never done    Pneumococcal Vaccine 0-64 (1 of 2 - PCV) Never done    ZOSTER VACCINE (1 of 2) Never done    TDAP/TD VACCINES (3 - Td or  "Tdap) 01/01/2017    HEPATITIS C SCREENING  Never done    ANNUAL PHYSICAL  Never done    PAP SMEAR  Never done    RSV Vaccine - Adults (1 - 1-dose 60+ series) Never done    COVID-19 Vaccine (1 - 2023-24 season) Never done       <no information>  Last Completed Colonoscopy       This patient has no relevant Health Maintenance data.            There is no immunization history on file for this patient.  Last Completed Mammogram       This patient has no relevant Health Maintenance data.              FAMILY HISTORY:  Family History   Problem Relation Age of Onset    Stroke Mother     Heart failure Mother      SOCIAL HISTORY:  Social History     Socioeconomic History    Marital status: Unknown   Tobacco Use    Smoking status: Every Day     Current packs/day: 0.50     Types: Cigarettes    Smokeless tobacco: Never   Substance and Sexual Activity    Alcohol use: Yes     Comment: Occasional    Drug use: No       REVIEW OF SYSTEMS:    Review of Systems   Constitutional:  Positive for fatigue. Negative for chills and fever.        \"I am tired.\"   HENT:  Negative for congestion and mouth sores.    Eyes:  Negative for discharge and redness.   Respiratory:  Negative for shortness of breath and wheezing.    Cardiovascular:  Negative for chest pain and palpitations.   Gastrointestinal:  Negative for constipation, diarrhea, nausea and vomiting.   Endocrine: Negative for cold intolerance and heat intolerance.   Genitourinary:  Negative for dysuria and flank pain.   Musculoskeletal:  Negative for gait problem and myalgias.   Skin:  Negative for pallor.   Allergic/Immunologic: Negative for food allergies.   Neurological:  Negative for dizziness, facial asymmetry and speech difficulty.   Hematological:  Negative for adenopathy. Does not bruise/bleed easily.   Psychiatric/Behavioral:  Negative for agitation, confusion and hallucinations.        VITAL SIGNS: /78   Pulse 99   Temp 97.5 °F (36.4 °C)   Resp 18   Ht 167.6 cm (66\")   " "Wt 49.4 kg (109 lb)   SpO2 100%   Breastfeeding No   BMI 17.59 kg/m²  Lost 3 pounds. \"My brother got sick and taking care of him.'  Pain Score    06/24/24 1434   PainSc: 0-No pain       PHYSICAL EXAMINATION:     Physical Exam  Vitals reviewed.   Constitutional:       General: She is not in acute distress.  HENT:      Head: Normocephalic and atraumatic.   Eyes:      General: No scleral icterus.  Cardiovascular:      Rate and Rhythm: Normal rate.   Pulmonary:      Effort: No respiratory distress.      Breath sounds: No wheezing.   Abdominal:      General: Bowel sounds are normal. There is no distension.      Palpations: Abdomen is soft.   Musculoskeletal:         General: No swelling.      Cervical back: Neck supple.   Skin:     Coloration: Skin is not pale.   Neurological:      Mental Status: She is alert and oriented to person, place, and time.   Psychiatric:         Mood and Affect: Mood normal.         Behavior: Behavior normal.         Thought Content: Thought content normal.         Judgment: Judgment normal.         LABS    Lab Results - Last 18 Months   Lab Units 04/19/24  0656   HEMOGLOBIN g/dL 14.5   HEMATOCRIT % 43.2   MCV fL 93.3   WBC K/uL 5.6   RDW % 13.6   MPV fL 8.5*   PLATELETS K/uL 367       No results for input(s): \"GLUCOSE\", \"NA\", \"K\", \"CO2\", \"CL\", \"ANIONGAP\", \"CREATININE\", \"BUN\", \"BCR\", \"CALCIUM\", \"EGFRIFNONA\", \"ALKPHOS\", \"PROTEINTOT\", \"ALT\", \"AST\", \"BILITOT\", \"ALBUMIN\", \"GLOB\" in the last 32341 hours.    Invalid input(s): \"LABIL\"    No results for input(s): \"MSPIKE\", \"KAPPALAMB\", \"IGLFLC\", \"URICACID\", \"FREEKAPPAL\", \"CEA\", \"LDH\", \"REFLABREPO\" in the last 00602 hours.    No results for input(s): \"IRON\", \"TIBC\", \"LABIRON\", \"FERRITIN\", \"W3AZYOA\", \"TSH\", \"FOLATE\" in the last 38644 hours.    Invalid input(s): \"VITB12\"    Teresa Long reports a pain score of 0.          ASSESSMENT:  1.   Squamous cell anus, 11/04/2009.  Current Stage: AJCC IIA (cT3, cN0, M0).  Baseline Node Status: Negative by " "PET.  Tumor [place]: Left aspect of rectum.  Complications of Tumor: Hematochezia.  Treatment status: Mitomycin C with CIV 5-FU and radiation 11/19/2009 and 01/11/2010 with radiation. Complete response.  2.   Performance status of 1.  3.   Left pelvic pain. No acute process. Bilateral osteoarthritis 2/23/2023.   4.   Lung nodules, right. Followed by Dr. Trevizo.  \"He see me once a year.\"  5.  Tobacco abuse. Per PCP. \"Off and on.  They are bad.\"  6.   Weight loss, per PCP.             PLAN:  1.    Re: Heme status. WBC 5.7, hemoglobin 14, hematocrit 40.5, MCV 91.4 and platelet 341    2.    Re: Pre office CMP.   ml/min.  3.    Re: Pre office CEA. 2.6   4.    Re: Note from Jenn Toussaint, PAC on 4/3/2024.  Patient seen for coronary arteriosclerosis, atrial flutter and supraventricular tachycardia.  Change Eliquis to Xarelto 20 mg daily for better medication compliance.  Follow-up in 4 to 6 weeks.  5.  Patient will be seeing Dr. Manuel Doshi for colonoscopy, thickening of the transverse and descending colon by CT scan. \"I will call. I had an appointment before.'  6.  Seen by Dr. Shane Campbell for diffuse bladder wall thickening. \"It was radiation effect.\"  7.   Re:  Continue surveillance for anal cancer.  She will be seen every 12 months.  8.  Continue to follow with Dr. Trevizo for lung nodules.  \"I saw him February. I got a new CT scan.\"  9.  Continue ongoing management per primary care physician and the other specialists.  9.   Plan of care discussed with patient.   Understanding expressed.  She is agreeable to proceed  10.   Return to office in 12 months with pre office CBC with differential, CMP, and CEA. Imaging as needed.         I have reviewed the assessment and plan and verified the accuracy of it. No changes to assessment and plan since the information was documented. Rashaun Antoine MD 06/24/24           I spent 33 total minutes, face-to-face, caring for " Teresa today. Greater than 50% of this time involved counseling and/or coordination of care as documented within this note.                  cc: (Jamse Lang MD)        (Manuel Doshi MD)        (Frde Dorantes MD)        Shane Amaya MD)         Nathan Oneil MD         (Jenn Toussaint, LANA)         DEVAN Bella APRN

## 2024-06-24 ENCOUNTER — OFFICE VISIT (OUTPATIENT)
Dept: ONCOLOGY | Facility: CLINIC | Age: 59
End: 2024-06-24
Payer: COMMERCIAL

## 2024-06-24 VITALS
HEART RATE: 99 BPM | WEIGHT: 109 LBS | RESPIRATION RATE: 18 BRPM | DIASTOLIC BLOOD PRESSURE: 78 MMHG | BODY MASS INDEX: 17.52 KG/M2 | TEMPERATURE: 97.5 F | HEIGHT: 66 IN | SYSTOLIC BLOOD PRESSURE: 138 MMHG | OXYGEN SATURATION: 100 %

## 2024-06-24 DIAGNOSIS — C21.0 ANAL CANCER: Primary | ICD-10-CM

## 2024-06-24 PROCEDURE — 1159F MED LIST DOCD IN RCRD: CPT | Performed by: INTERNAL MEDICINE

## 2024-06-24 PROCEDURE — 99214 OFFICE O/P EST MOD 30 MIN: CPT | Performed by: INTERNAL MEDICINE

## 2024-06-24 PROCEDURE — 3075F SYST BP GE 130 - 139MM HG: CPT | Performed by: INTERNAL MEDICINE

## 2024-06-24 PROCEDURE — 3078F DIAST BP <80 MM HG: CPT | Performed by: INTERNAL MEDICINE

## 2024-06-24 PROCEDURE — 1160F RVW MEDS BY RX/DR IN RCRD: CPT | Performed by: INTERNAL MEDICINE

## 2024-06-24 PROCEDURE — 1126F AMNT PAIN NOTED NONE PRSNT: CPT | Performed by: INTERNAL MEDICINE

## 2024-06-24 RX ORDER — APIXABAN 5 MG/1
1 TABLET, FILM COATED ORAL 2 TIMES DAILY
COMMUNITY

## 2024-06-24 NOTE — LETTER
June 24, 2024       No Recipients    Patient: Teresa Long   YOB: 1965   Date of Visit: 6/24/2024     Dear DEVAN Trivedi:       Thank you for referring Teresa Long to me for evaluation. Below are the relevant portions of my assessment and plan of care.    If you have questions, please do not hesitate to call me. I look forward to following Teresa along with you.         Sincerely,        Rashaun Antoine MD        CC:   No Recipients    Rashaun Antoine MD  06/24/24 1459  Sign when Signing Visit  [facility]  [address]  [place] [id number] [phone]    Patient Name: Teresa Long  Encounter Date: 06/24/2024  YOB: 1965  Patient Number: [id number]      REASON FOR FOLLOW-UP: Ms. Teresa Long is a pleasant 59-year-old  female who is seen on follow-up for Stage IIB (cT2, cN2, M0) anal cancer.  She had completed 5-FU with mitomycin C and radiation on 1/11/2010.  She is seen alone.  History obtained from the patient.  The patient is a reliable historian.           Oncology/Hematology History Overview Note   DIAGNOSTIC ABNORMALITIES:  The patient had undergone endoscopic ultrasound 11/04/2009. It showed a T3 lower rectal lesion with enlarged lymph node in the perirectal tissue, 10 cm from the anal verge.  PET scan 11/06/2009 showed intense FDG uptake with SUV approximately 8 along the left aspect of the rectum/anus. Left inguinal and bilateral small iliac nodes demonstrated no abnormal uptake. The patient is planned for neoadjuvant chemoradiation.  The patient was seen by Dr. Leonard 11/11/2009. The patient presented with blood in her stool. Colonoscopy showed a 3 cm ulcerated distal rectal mass. Biopsies showed invasive squamous cell carcinoma. The patient also had multiple sessile 1-2 mm polyps between 10 and 15 cm; all were removed. There is a cyst in the liver, benign.   Rectal  junction biopsy 03/25/2010 [facility] negative for malignancy.   Bone scan 09/23/2011 negative for metastasis.  CT of the abdomen and pelvis 05/01/2012 showed hepatic cyst. Left renal stone. Thickening of the wall of the urinary bladder.  Flexible sigmoidoscopy by Dr. 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 No evidence of tumor recurrence.   The patient was last seen by Dr. Lenoard 07/22/2015. She is in remission. CEA was normal.  Colonoscopy by Dr. Jasiel See 01/13/2016 showed radiation proctitis. Multiple colon polyps removed. No evidence of tumor recurrence within the rectum.  The patient had undergone flexible sigmoidoscopy 05/02/2018. No evidence of tumor recurrence. Radiation proctitis and internal hemorrhoids were found.     PREVIOUS INTERVENTIONS:   The patient was given mitomycin C with continuous infusion of 5-FU, 11/19/2009 and cycle 2 was given 01/11/2010 by Dr. Leonard.       Rectal cancer   12/30/2019 Initial Diagnosis    Rectal cancer (CMS/HCC)     12/30/2019 Cancer Staged    Staging form: Colon and Rectum, AJCC V7  - Clinical stage from 12/30/2019: Stage IIA (T3, N0, M0) - Signed by Rashaun Antoine MD on 12/30/2019         PAST MEDICAL HISTORY:  ALLERGIES:  Allergies   Allergen Reactions   • Desyrel [Trazodone]    • Keftab [Cephalexin]    • Ultram [Tramadol Hcl]    • Paxil [Paroxetine Hcl] Palpitations     CURRENT MEDICATIONS:  Outpatient Encounter Medications as of 6/24/2024   Medication Sig Dispense Refill   • ADVAIR DISKUS 100-50 MCG/DOSE DISKUS Daily.     • amphetamine-dextroamphetamine XR (ADDERALL XR) 25 MG 24 hr capsule Take 1 capsule by mouth Daily     • Blood Glucose Monitoring Suppl (ONE TOUCH ULTRA 2) w/Device kit      • carvedilol (COREG) 3.125 MG tablet Take 1 tablet by mouth 2 (Two) Times a Day. 60 tablet 11   • cetirizine (zyrTEC) 10 MG tablet Take 1 tablet by mouth Daily.     • clotrimazole (LOTRIMIN) 1 % cream Apply 1 Application topically to the appropriate  area as directed Daily.     • cyanocobalamin 1000 MCG/ML injection Inject 1,000 mL into the appropriate muscle as directed by prescriber Every 30 (Thirty) Days.     • diazePAM (VALIUM) 10 MG tablet Take 1 tablet by mouth 4 (Four) Times a Day.     • Eliquis 5 MG tablet tablet Take 1 tablet by mouth 2 (Two) Times a Day.     • fluticasone (FLONASE) 50 MCG/ACT nasal spray 50 sprays into the nostril(s) as directed by provider Daily.     • HYDROcodone-acetaminophen (NORCO)  MG per tablet Take  tablets by mouth 4 (Four) Times a Day.     • loperamide (IMODIUM) 2 MG capsule Take 1 capsule by mouth Daily.     • naproxen (NAPROSYN) 500 MG tablet Take 1 tablet by mouth As Needed.     • nystatin (MYCOSTATIN) 988273 UNIT/GM cream Apply 10,000 Applications topically to the appropriate area as directed.     • ONE TOUCH ULTRA TEST test strip      • ONETOUCH DELICA LANCETS 33G misc      • pantoprazole (PROTONIX) 40 MG EC tablet Take 1 tablet by mouth Daily.     • PREMARIN 0.625 MG/GM vaginal cream 0.625 application 1 (One) Time Per Week.     • sertraline (ZOLOFT) 50 MG tablet Take 1 tablet by mouth Daily.     • sucralfate (CARAFATE) 1 G tablet Take 1 tablet by mouth As Needed.     • SYNTHROID 25 MCG tablet Take 1,000 tablets by mouth Daily.     • triamterene-hydrochlorothiazide (DYAZIDE) 37.5-25 MG per capsule As Needed.     • VENTOLIN  (90 BASE) MCG/ACT inhaler As Needed.     • meloxicam (MOBIC) 7.5 MG tablet Take 1 tablet by mouth As Needed. (Patient not taking: Reported on 6/24/2024)       No facility-administered encounter medications on file as of 6/24/2024.     ADULT ILLNESSES:  Patient Active Problem List   Diagnosis Code   • Chronic edema R60.9   • Tobacco abuse Z72.0   • GERD (gastroesophageal reflux disease) K21.9   • Hypothyroid E03.9   • Anxiety F41.9   • ADHD (attention deficit hyperactivity disorder) F90.9   • Chronic pain G89.29   • Essential hypertension I10   • Rectal cancer C20  "    SURGERIES:  Past Surgical History:   Procedure Laterality Date   • APPENDECTOMY     • DILATATION AND CURETTAGE      Of Uterus x 5   • GYNECOLOGIC CRYOSURGERY      Lesion of cervix   • TONSILLECTOMY     • VENOUS ACCESS DEVICE (PORT) INSERTION AND REMOVAL  03/17/2015    Port implant and taken out     HEALTH MAINTENANCE ITEMS:  Health Maintenance Due   Topic Date Due   • MAMMOGRAM  Never done   • COLONOSCOPY  Never done   • BMI FOLLOWUP  Never done   • Pneumococcal Vaccine 0-64 (1 of 2 - PCV) Never done   • ZOSTER VACCINE (1 of 2) Never done   • TDAP/TD VACCINES (3 - Td or Tdap) 01/01/2017   • HEPATITIS C SCREENING  Never done   • ANNUAL PHYSICAL  Never done   • PAP SMEAR  Never done   • RSV Vaccine - Adults (1 - 1-dose 60+ series) Never done   • COVID-19 Vaccine (1 - 2023-24 season) Never done       <no information>  Last Completed Colonoscopy       This patient has no relevant Health Maintenance data.            There is no immunization history on file for this patient.  Last Completed Mammogram       This patient has no relevant Health Maintenance data.              FAMILY HISTORY:  Family History   Problem Relation Age of Onset   • Stroke Mother    • Heart failure Mother      SOCIAL HISTORY:  Social History     Socioeconomic History   • Marital status: Unknown   Tobacco Use   • Smoking status: Every Day     Current packs/day: 0.50     Types: Cigarettes   • Smokeless tobacco: Never   Substance and Sexual Activity   • Alcohol use: Yes     Comment: Occasional   • Drug use: No       REVIEW OF SYSTEMS:    Review of Systems   Constitutional:  Positive for fatigue. Negative for chills and fever.        \"I am tired.\"   HENT:  Negative for congestion and mouth sores.    Eyes:  Negative for discharge and redness.   Respiratory:  Negative for shortness of breath and wheezing.    Cardiovascular:  Negative for chest pain and palpitations.   Gastrointestinal:  Negative for constipation, diarrhea, nausea and vomiting. " "  Endocrine: Negative for cold intolerance and heat intolerance.   Genitourinary:  Negative for dysuria and flank pain.   Musculoskeletal:  Negative for gait problem and myalgias.   Skin:  Negative for pallor.   Allergic/Immunologic: Negative for food allergies.   Neurological:  Negative for dizziness, facial asymmetry and speech difficulty.   Hematological:  Negative for adenopathy. Does not bruise/bleed easily.   Psychiatric/Behavioral:  Negative for agitation, confusion and hallucinations.        VITAL SIGNS: /78   Pulse 99   Temp 97.5 °F (36.4 °C)   Resp 18   Ht 167.6 cm (66\")   Wt 49.4 kg (109 lb)   SpO2 100%   Breastfeeding No   BMI 17.59 kg/m²  Lost 3 pounds. \"My brother got sick and taking care of him.'  Pain Score    06/24/24 1434   PainSc: 0-No pain       PHYSICAL EXAMINATION:     Physical Exam  Vitals reviewed.   Constitutional:       General: She is not in acute distress.  HENT:      Head: Normocephalic and atraumatic.   Eyes:      General: No scleral icterus.  Cardiovascular:      Rate and Rhythm: Normal rate.   Pulmonary:      Effort: No respiratory distress.      Breath sounds: No wheezing.   Abdominal:      General: Bowel sounds are normal. There is no distension.      Palpations: Abdomen is soft.   Musculoskeletal:         General: No swelling.      Cervical back: Neck supple.   Skin:     Coloration: Skin is not pale.   Neurological:      Mental Status: She is alert and oriented to person, place, and time.   Psychiatric:         Mood and Affect: Mood normal.         Behavior: Behavior normal.         Thought Content: Thought content normal.         Judgment: Judgment normal.         LABS    Lab Results - Last 18 Months   Lab Units 04/19/24  0656   HEMOGLOBIN g/dL 14.5   HEMATOCRIT % 43.2   MCV fL 93.3   WBC K/uL 5.6   RDW % 13.6   MPV fL 8.5*   PLATELETS K/uL 367       No results for input(s): \"GLUCOSE\", \"NA\", \"K\", \"CO2\", \"CL\", \"ANIONGAP\", \"CREATININE\", \"BUN\", \"BCR\", \"CALCIUM\", " "\"EGFRIFNONA\", \"ALKPHOS\", \"PROTEINTOT\", \"ALT\", \"AST\", \"BILITOT\", \"ALBUMIN\", \"GLOB\" in the last 97027 hours.    Invalid input(s): \"LABIL\"    No results for input(s): \"MSPIKE\", \"KAPPALAMB\", \"IGLFLC\", \"URICACID\", \"FREEKAPPAL\", \"CEA\", \"LDH\", \"REFLABREPO\" in the last 37309 hours.    No results for input(s): \"IRON\", \"TIBC\", \"LABIRON\", \"FERRITIN\", \"Q0LTECL\", \"TSH\", \"FOLATE\" in the last 41299 hours.    Invalid input(s): \"VITB12\"    Teresa K Ethan reports a pain score of 0.          ASSESSMENT:  1.   Squamous cell anus, 11/04/2009.  Current Stage: AJCC IIA (cT3, cN0, M0).  Baseline Node Status: Negative by PET.  Tumor [place]: Left aspect of rectum.  Complications of Tumor: Hematochezia.  Treatment status: Mitomycin C with CIV 5-FU and radiation 11/19/2009 and 01/11/2010 with radiation. Complete response.  2.   Performance status of 1.  3.   Left pelvic pain. No acute process. Bilateral osteoarthritis 2/23/2023.   4.   Lung nodules, right. Followed by Dr. Trevizo.  \"He see me once a year.\"  5.  Tobacco abuse. Per PCP. \"Off and on.  They are bad.\"  6.   Weight loss, per PCP.             PLAN:  1.    Re: Heme status. WBC 5.7, hemoglobin 14, hematocrit 40.5, MCV 91.4 and platelet 341    2.    Re: Pre office CMP.   ml/min.  3.    Re: Pre office CEA. 2.6   4.    Re: Note from Jenn Toussaint, PAC on 4/3/2024.  Patient seen for coronary arteriosclerosis, atrial flutter and supraventricular tachycardia.  Change Eliquis to Xarelto 20 mg daily for better medication compliance.  Follow-up in 4 to 6 weeks.  5.  Patient will be seeing Dr. Manuel Doshi for colonoscopy, thickening of the transverse and descending colon by CT scan. \"I will call. I had an appointment before.'  6.  Seen by Dr. Shane Campbell for diffuse bladder wall thickening. \"It was radiation effect.\"  7.   Re:  Continue surveillance for anal cancer.  She will be seen every 12 months.  8.  Continue to follow with  dos " "Debra for lung nodules.  \"I saw him February. I got a new CT scan.\"  9.  Continue ongoing management per primary care physician and the other specialists.  9.   Plan of care discussed with patient.   Understanding expressed.  She is agreeable to proceed  10.   Return to office in 12 months with pre office CBC with differential, CMP, and CEA. Imaging as needed.         I have reviewed the assessment and plan and verified the accuracy of it. No changes to assessment and plan since the information was documented. Rashaun Antoine MD 06/24/24           I spent 33 total minutes, face-to-face, caring for Teresa today. Greater than 50% of this time involved counseling and/or coordination of care as documented within this note.                  cc: (James Lang MD)        (Manuel Doshi MD)        (Fred Dorantes MD)        Shane Amaya MD)         Nathan Oneil MD         (Jenn Toussaint, LANA)         DEVAN Bella APRN    "

## 2025-01-21 ENCOUNTER — OFFICE VISIT (OUTPATIENT)
Dept: CARDIOLOGY | Facility: CLINIC | Age: 60
End: 2025-01-21
Payer: COMMERCIAL

## 2025-01-21 ENCOUNTER — PREP FOR SURGERY (OUTPATIENT)
Dept: OTHER | Facility: HOSPITAL | Age: 60
End: 2025-01-21
Payer: COMMERCIAL

## 2025-01-21 VITALS
DIASTOLIC BLOOD PRESSURE: 70 MMHG | BODY MASS INDEX: 17.68 KG/M2 | WEIGHT: 110 LBS | HEART RATE: 157 BPM | HEIGHT: 66 IN | SYSTOLIC BLOOD PRESSURE: 124 MMHG

## 2025-01-21 DIAGNOSIS — I47.10 SUSTAINED SVT: Primary | ICD-10-CM

## 2025-01-21 DIAGNOSIS — R07.89 CHEST PAIN, ATYPICAL: ICD-10-CM

## 2025-01-21 DIAGNOSIS — R06.02 SHORTNESS OF BREATH: ICD-10-CM

## 2025-01-21 PROCEDURE — 1159F MED LIST DOCD IN RCRD: CPT | Performed by: STUDENT IN AN ORGANIZED HEALTH CARE EDUCATION/TRAINING PROGRAM

## 2025-01-21 PROCEDURE — 3078F DIAST BP <80 MM HG: CPT | Performed by: STUDENT IN AN ORGANIZED HEALTH CARE EDUCATION/TRAINING PROGRAM

## 2025-01-21 PROCEDURE — 1160F RVW MEDS BY RX/DR IN RCRD: CPT | Performed by: STUDENT IN AN ORGANIZED HEALTH CARE EDUCATION/TRAINING PROGRAM

## 2025-01-21 PROCEDURE — 93000 ELECTROCARDIOGRAM COMPLETE: CPT | Performed by: STUDENT IN AN ORGANIZED HEALTH CARE EDUCATION/TRAINING PROGRAM

## 2025-01-21 PROCEDURE — 3074F SYST BP LT 130 MM HG: CPT | Performed by: STUDENT IN AN ORGANIZED HEALTH CARE EDUCATION/TRAINING PROGRAM

## 2025-01-21 PROCEDURE — 99204 OFFICE O/P NEW MOD 45 MIN: CPT | Performed by: STUDENT IN AN ORGANIZED HEALTH CARE EDUCATION/TRAINING PROGRAM

## 2025-01-21 RX ORDER — FAMOTIDINE 20 MG/1
20 TABLET, FILM COATED ORAL 2 TIMES DAILY
COMMUNITY

## 2025-01-21 RX ORDER — SODIUM CHLORIDE 9 MG/ML
40 INJECTION, SOLUTION INTRAVENOUS AS NEEDED
OUTPATIENT
Start: 2025-01-21

## 2025-01-21 RX ORDER — SODIUM CHLORIDE 0.9 % (FLUSH) 0.9 %
10 SYRINGE (ML) INJECTION AS NEEDED
OUTPATIENT
Start: 2025-01-21

## 2025-01-21 RX ORDER — SOTALOL HYDROCHLORIDE 80 MG/1
80 TABLET ORAL 2 TIMES DAILY
COMMUNITY

## 2025-01-21 RX ORDER — SODIUM CHLORIDE 0.9 % (FLUSH) 0.9 %
10 SYRINGE (ML) INJECTION EVERY 12 HOURS SCHEDULED
OUTPATIENT
Start: 2025-01-21

## 2025-01-21 RX ORDER — BUPROPION HYDROCHLORIDE 150 MG/1
150 TABLET, EXTENDED RELEASE ORAL 2 TIMES DAILY
COMMUNITY

## 2025-01-21 NOTE — PATIENT INSTRUCTIONS
1.  2-month follow-up  2.  Schedule for SVT ablation  3.  Go to the ER for severe chest pain, severe shortness of breath, or episodes that are not terminating over the course of a day

## 2025-01-21 NOTE — PROGRESS NOTES
"EP NEW PATIENT VISIT    Chief Complaint  Rapid Heart Rate    Subjective        History of Present Illness    EP Problems:  1.  Sustained SVT versus atrial flutter    Cardiology Problems:  1.  Hypertension    Medical Problems:  1.  Hypothyroidism  2.  COPD  3.  GERD  4.  Anxiety  5.  ADD  History of Present Illness  The patient is a 60-year-old woman who presents to the clinic for evaluation of sustained supraventricular tachycardia (SVT). sounds    She has had several years of episodes of tachycardia and heart racing, typically lasting for several hours. During these episodes, her heart rate can exceed 150 bpm. Deep breathing or relaxation often alleviates these symptoms. She has sought emergency care and has been hospitalized due to this condition. At one point, she was administered adenosine by EMS, which successfully terminated the tachycardia. A Holter monitor confirmed episodes of SVT. Occasionally, these episodes are accompanied by chest pain, described as a crushing sensation, and are almost always associated with shortness of breath and weakness. Given these findings, she was referred to an electrophysiologist for further evaluation. She has been treated with sotalol for her rhythm problems.    MEDICATIONS  sotalol, Xarelto      Objective   Vital Signs:  /70   Pulse (!) 157   Ht 167.6 cm (66\")   Wt 49.9 kg (110 lb)   BMI 17.75 kg/m²   Estimated body mass index is 17.75 kg/m² as calculated from the following:    Height as of this encounter: 167.6 cm (66\").    Weight as of this encounter: 49.9 kg (110 lb).      Physical Exam     Physical Exam  The patient appears cachectic.  Lungs are clear to auscultation bilaterally with normal work of breathing.  Heart exhibits tachycardia with a regular rhythm.  Lower extremities are warm and well perfused without significant edema.    Result Review :  The following data was reviewed by: Mony Wray MD on 01/21/2025:    ZAY          4/19/2024    06:56   ZAY "   WBC 5.6       RBC 4.63       Hemoglobin 14.5       Hematocrit 43.2       MCV 93.3       MCH 31.3       MCHC 33.6       RDW 13.6       Platelets 367          Details          This result is from an external source.                 [id number] SCORE   [id number] Score: 2 (1/21/2025  5:07 PM)          ECG 12 Lead    Date/Time: 1/21/2025 5:53 PM  Performed by: Mony Wray MD    Authorized by: Mony Wray MD  Comparison: compared with previous ECG from 2/22/2024  Comparison to previous ECG: Narrow complex tachycardia has replaced sinus rhythm  Rhythm: supraventricular tachycardia  Rhythm comments: SVT versus atrial flutter is present  Rate: tachycardic  BPM: 157  Conduction: conduction normal  QRS axis: normal  Other findings: non-specific ST-T wave changes    Clinical impression: abnormal EKG              Assessment and Plan   Diagnoses and all orders for this visit:    1. Sustained SVT (Primary)    2. Chest pain, atypical    3. Shortness of breath        It is unclear to me exactly whether this is SVT, atrial flutter, or both.  Some of the characteristics of the tachycardia sound suggestive of SVT, particularly the history of termination with adenosine.  Despite this, her ECG today in the clinic looks more suggestive of an atrial flutter and was not terminating with vagal maneuvers or carotid massage.  It is quite possible that she does have 2 different rhythm problems going on as well.    I have discussed risks, benefits, and alternatives of an electrophysiology study and ablation for supraventricular tachycardia with the patient.  Alternatives discussed include continued observation and medical management.  An electrophysiology study with ablation is an inherently high risk procedure with possible complications that include but are not limited to vascular access complications, internal bleeding, tamponade requiring pericardiocentesis or cardiac surgery, stroke, MI, embolism, myocardial injury, injury to the  normal conduction system requiring a pacemaker, and ultimately death.  We also discussed that given the uncertain nature of the diagnosis, therapeutic efficacy can be variable.  Possibilities of recurrent arrhythmias and the possible need for additional procedures and/or medical therapy was discussed. Questions asked were appropriately answered.  No guarantees were made or implied.     Despite this, they would still like to proceed.      Assessment & Plan  1. Sustained supraventricular tachycardia (SVT) versus atrial flutter  2. Dyspnea  3. Chest pain  - Schedule electrophysiology study and ablation for symptomatic SVT  - Continue current regimen of sotalol and Xarelto for anticoagulation  - Advised to seek immediate medical attention at the emergency room if experiencing severe chest pain, severe shortness of breath, or if tachycardia does not subside within a day of onset                     Follow Up   Return in about 2 months (around 3/21/2025).  Patient was given instructions and counseling regarding her condition or for health maintenance advice. Please see specific information pulled into the AVS if appropriate.     Part of this note may be an electronic transcription/translation of spoken language to printed text using the Dragon Dictation System.    Patient or patient representative verbalized consent for the use of Ambient Listening during the visit with  Mony Wray MD for chart documentation. 1/21/2025  17:53 CST

## 2025-01-24 ENCOUNTER — PATIENT ROUNDING (BHMG ONLY) (OUTPATIENT)
Dept: CARDIOLOGY | Facility: CLINIC | Age: 60
End: 2025-01-24
Payer: COMMERCIAL

## 2025-01-24 NOTE — PROGRESS NOTES
A [email or web address] message has been sent to the patient for PATIENT ROUNDING with [facility].

## 2025-02-10 ENCOUNTER — TELEPHONE (OUTPATIENT)
Dept: CARDIOLOGY | Facility: CLINIC | Age: 60
End: 2025-02-10
Payer: COMMERCIAL

## 2025-02-10 NOTE — TELEPHONE ENCOUNTER
Call received from Ms. Long with questions in regards to her scheduled ablation. We discussed the procedure at length including risks, anesthesia, intra-op procedures, recovery, bedrest, sheath removal, discharge criteria, and normal post-procedure expectations.  I answered a few remaining questions. Patient was instructed to hold sotalol 48 hours prior to procedure. Ms. Long verbalized understanding and she is ready to proceed.       Quita Estrada RN

## 2025-03-28 NOTE — DISCHARGE INSTRUCTIONS
Post-EP study Instructions    ACTIVITY    Avoid driving, operating machinery, or alcohol consumption for 24 hours after receiving sedation. In addition, avoid signing legal documents or participating in legal proceedings.    You may resume normal activities after 24 hours except for the following:    Avoid heavy lifting (no more than 10 pounds) and vigorous activities for a minimum of 7 days. This includes activities such as jogging, exercise classes, sports activities, etc.    You may resume walking and other normal activities.    Avoid sitting more than 2 consecutive hours during waking hours for the first 3 days. If you are traveling, stop for brief (5 minutes) walks every two hours.    MEDICATIONS   Stop sotalol  Continue your blood thinners for 1 week and then stop    MEDICAL FOLLOW UP   EP clinic follow up with Avila Santana on 5/2/2025..    PLEASE NOTIFY US IF YOU DEVELOP    Fever of 101 or greater during your first few days at home    The occurrence of a new, persistent cough or unexplained shortness of breath.    A groin bruise may be expected. Initial soreness and discomfort should improve over the first few days. If you continue to have discomfort or if bruising is excessive, please call us.    Patients often experience palpitations during the healing period.    Please call if you have an episode of palpitations accompanied by fast heart rate greater than 120 beats per minute for extended period that last longer than 1-2 days.    Mild chest soreness is common and may be treated with Tylenol, Advil, or Motrin.  This soreness typically worsens when taking deep breaths.  If you notice these symptoms, start one of these medications according to the package directions and continue for 2-3 days. If your symptoms are not relieved or become severe, please call us.      REASONS TO GO TO THE EMERGENCY ROOM FOR EVALUATION:   Severe chest pain  Significant shortness of breath at rest  Near passing out or passing out  episodes soon after your ablation  Symptoms of chest pain or shortness of breath associated with low blood pressure (reading less than 90 for the top number / systolic blood pressure)  Brisk bleeding or significant swelling from the groin where IVs were placed  Any concerns that an emergent or life threatening complication is occurring    If you have a clinical questions between the hours of 8am and 4pm, Monday - Friday please call the office and let us know your concern. Please leave a message if your call is not an emergency.    For emergencies, please go for evaluation at your nearest emergency department.    If you have a [employer] account, this an excellent way to communicate.  [employer] messages are checked Monday through Friday. Please allow 24-36 hours for your message to be answered.

## 2025-04-02 ENCOUNTER — ANESTHESIA (OUTPATIENT)
Dept: CARDIOLOGY | Facility: HOSPITAL | Age: 60
End: 2025-04-02
Payer: COMMERCIAL

## 2025-04-02 ENCOUNTER — ANESTHESIA EVENT (OUTPATIENT)
Dept: CARDIOLOGY | Facility: HOSPITAL | Age: 60
End: 2025-04-02
Payer: COMMERCIAL

## 2025-04-02 ENCOUNTER — HOSPITAL ENCOUNTER (OUTPATIENT)
Facility: HOSPITAL | Age: 60
Setting detail: HOSPITAL OUTPATIENT SURGERY
Discharge: HOME | End: 2025-04-02
Attending: STUDENT IN AN ORGANIZED HEALTH CARE EDUCATION/TRAINING PROGRAM | Admitting: STUDENT IN AN ORGANIZED HEALTH CARE EDUCATION/TRAINING PROGRAM
Payer: COMMERCIAL

## 2025-04-02 VITALS
OXYGEN SATURATION: 95 % | HEIGHT: 66 IN | RESPIRATION RATE: 19 BRPM | BODY MASS INDEX: 18 KG/M2 | HEART RATE: 97 BPM | DIASTOLIC BLOOD PRESSURE: 73 MMHG | WEIGHT: 112 LBS | TEMPERATURE: 96.8 F | SYSTOLIC BLOOD PRESSURE: 167 MMHG

## 2025-04-02 DIAGNOSIS — G89.29 OTHER CHRONIC PAIN: Primary | ICD-10-CM

## 2025-04-02 DIAGNOSIS — I47.10 SUSTAINED SVT: ICD-10-CM

## 2025-04-02 LAB
ANION GAP SERPL CALCULATED.3IONS-SCNC: 10 MMOL/L (ref 5–15)
BUN SERPL-MCNC: 9 MG/DL (ref 8–23)
BUN/CREAT SERPL: 21.4 (ref 7–25)
CALCIUM SPEC-SCNC: 8.6 MG/DL (ref 8.6–10.5)
CHLORIDE SERPL-SCNC: 103 MMOL/L (ref 98–107)
CO2 SERPL-SCNC: 28 MMOL/L (ref 22–29)
CREAT SERPL-MCNC: 0.42 MG/DL (ref 0.57–1)
DEPRECATED RDW RBC AUTO: 47.2 FL (ref 37–54)
EGFRCR SERPLBLD CKD-EPI 2021: 110.8 ML/MIN/1.73
EOSINOPHIL # BLD MANUAL: 0.11 10*3/MM3 (ref 0–0.4)
EOSINOPHIL NFR BLD MANUAL: 2 % (ref 0.3–6.2)
ERYTHROCYTE [DISTWIDTH] IN BLOOD BY AUTOMATED COUNT: 13.9 % (ref 12.3–15.4)
GLUCOSE SERPL-MCNC: 94 MG/DL (ref 65–99)
HCT VFR BLD AUTO: 38.3 % (ref 34–46.6)
HGB BLD-MCNC: 12.7 G/DL (ref 12–15.9)
INR PPP: 1.63 (ref 0.91–1.09)
LYMPHOCYTES # BLD MANUAL: 2.13 10*3/MM3 (ref 0.7–3.1)
LYMPHOCYTES NFR BLD MANUAL: 4 % (ref 5–12)
MCH RBC QN AUTO: 30.8 PG (ref 26.6–33)
MCHC RBC AUTO-ENTMCNC: 33.2 G/DL (ref 31.5–35.7)
MCV RBC AUTO: 93 FL (ref 79–97)
MONOCYTES # BLD: 0.22 10*3/MM3 (ref 0.1–0.9)
NEUTROPHILS # BLD AUTO: 3.01 10*3/MM3 (ref 1.7–7)
NEUTROPHILS NFR BLD MANUAL: 55 % (ref 42.7–76)
NRBC BLD AUTO-RTO: 0 /100 WBC (ref 0–0.2)
PLATELET # BLD AUTO: 335 10*3/MM3 (ref 140–450)
PMV BLD AUTO: 8.3 FL (ref 6–12)
POTASSIUM SERPL-SCNC: 3.3 MMOL/L (ref 3.5–5.2)
PROTHROMBIN TIME: 20.2 SECONDS (ref 11.8–14.8)
RBC # BLD AUTO: 4.12 10*6/MM3 (ref 3.77–5.28)
RBC MORPH BLD: NORMAL
SMALL PLATELETS BLD QL SMEAR: ADEQUATE
SODIUM SERPL-SCNC: 141 MMOL/L (ref 136–145)
VARIANT LYMPHS NFR BLD MANUAL: 39 % (ref 19.6–45.3)
WBC MORPH BLD: NORMAL
WBC NRBC COR # BLD AUTO: 5.47 10*3/MM3 (ref 3.4–10.8)

## 2025-04-02 PROCEDURE — C1894 INTRO/SHEATH, NON-LASER: HCPCS | Performed by: STUDENT IN AN ORGANIZED HEALTH CARE EDUCATION/TRAINING PROGRAM

## 2025-04-02 PROCEDURE — 25010000002 PROPOFOL 1000 MG/100ML EMULSION: Performed by: NURSE ANESTHETIST, CERTIFIED REGISTERED

## 2025-04-02 PROCEDURE — 93662 INTRACARDIAC ECG (ICE): CPT | Performed by: STUDENT IN AN ORGANIZED HEALTH CARE EDUCATION/TRAINING PROGRAM

## 2025-04-02 PROCEDURE — C1760 CLOSURE DEV, VASC: HCPCS | Performed by: STUDENT IN AN ORGANIZED HEALTH CARE EDUCATION/TRAINING PROGRAM

## 2025-04-02 PROCEDURE — 85007 BL SMEAR W/DIFF WBC COUNT: CPT | Performed by: STUDENT IN AN ORGANIZED HEALTH CARE EDUCATION/TRAINING PROGRAM

## 2025-04-02 PROCEDURE — C1732 CATH, EP, DIAG/ABL, 3D/VECT: HCPCS | Performed by: STUDENT IN AN ORGANIZED HEALTH CARE EDUCATION/TRAINING PROGRAM

## 2025-04-02 PROCEDURE — C1766 INTRO/SHEATH,STRBLE,NON-PEEL: HCPCS | Performed by: STUDENT IN AN ORGANIZED HEALTH CARE EDUCATION/TRAINING PROGRAM

## 2025-04-02 PROCEDURE — 25010000002 PROPOFOL 10 MG/ML EMULSION: Performed by: NURSE ANESTHETIST, CERTIFIED REGISTERED

## 2025-04-02 PROCEDURE — 93653 COMPRE EP EVAL TX SVT: CPT | Performed by: STUDENT IN AN ORGANIZED HEALTH CARE EDUCATION/TRAINING PROGRAM

## 2025-04-02 PROCEDURE — C1730 CATH, EP, 19 OR FEW ELECT: HCPCS | Performed by: STUDENT IN AN ORGANIZED HEALTH CARE EDUCATION/TRAINING PROGRAM

## 2025-04-02 PROCEDURE — 80048 BASIC METABOLIC PNL TOTAL CA: CPT | Performed by: STUDENT IN AN ORGANIZED HEALTH CARE EDUCATION/TRAINING PROGRAM

## 2025-04-02 PROCEDURE — 93623 PRGRMD STIMJ&PACG IV RX NFS: CPT | Performed by: STUDENT IN AN ORGANIZED HEALTH CARE EDUCATION/TRAINING PROGRAM

## 2025-04-02 PROCEDURE — 93010 ELECTROCARDIOGRAM REPORT: CPT | Performed by: INTERNAL MEDICINE

## 2025-04-02 PROCEDURE — 85610 PROTHROMBIN TIME: CPT | Performed by: STUDENT IN AN ORGANIZED HEALTH CARE EDUCATION/TRAINING PROGRAM

## 2025-04-02 PROCEDURE — C1759 CATH, INTRA ECHOCARDIOGRAPHY: HCPCS | Performed by: STUDENT IN AN ORGANIZED HEALTH CARE EDUCATION/TRAINING PROGRAM

## 2025-04-02 PROCEDURE — 25810000003 SODIUM CHLORIDE 0.9 % SOLUTION 250 ML FLEX CONT: Performed by: NURSE ANESTHETIST, CERTIFIED REGISTERED

## 2025-04-02 PROCEDURE — 25010000002 LIDOCAINE 2% SOLUTION: Performed by: STUDENT IN AN ORGANIZED HEALTH CARE EDUCATION/TRAINING PROGRAM

## 2025-04-02 PROCEDURE — 25010000002 ADENOSINE PER 6 MG: Performed by: STUDENT IN AN ORGANIZED HEALTH CARE EDUCATION/TRAINING PROGRAM

## 2025-04-02 PROCEDURE — 25010000002 HEPARIN (PORCINE) 2000-0.9 UNIT/L-% SOLUTION: Performed by: STUDENT IN AN ORGANIZED HEALTH CARE EDUCATION/TRAINING PROGRAM

## 2025-04-02 PROCEDURE — 93005 ELECTROCARDIOGRAM TRACING: CPT | Performed by: STUDENT IN AN ORGANIZED HEALTH CARE EDUCATION/TRAINING PROGRAM

## 2025-04-02 PROCEDURE — S0260 H&P FOR SURGERY: HCPCS | Performed by: STUDENT IN AN ORGANIZED HEALTH CARE EDUCATION/TRAINING PROGRAM

## 2025-04-02 PROCEDURE — 85025 COMPLETE CBC W/AUTO DIFF WBC: CPT | Performed by: STUDENT IN AN ORGANIZED HEALTH CARE EDUCATION/TRAINING PROGRAM

## 2025-04-02 PROCEDURE — 25010000002 HEPARIN (PORCINE) 1000-0.9 UT/500ML-% SOLUTION: Performed by: STUDENT IN AN ORGANIZED HEALTH CARE EDUCATION/TRAINING PROGRAM

## 2025-04-02 RX ORDER — SODIUM CHLORIDE 0.9 % (FLUSH) 0.9 %
10 SYRINGE (ML) INJECTION AS NEEDED
Status: DISCONTINUED | OUTPATIENT
Start: 2025-04-02 | End: 2025-04-02 | Stop reason: HOSPADM

## 2025-04-02 RX ORDER — HEPARIN SODIUM 200 [USP'U]/100ML
INJECTION, SOLUTION INTRAVENOUS
Status: DISCONTINUED | OUTPATIENT
Start: 2025-04-02 | End: 2025-04-02 | Stop reason: HOSPADM

## 2025-04-02 RX ORDER — HYDROCODONE BITARTRATE AND ACETAMINOPHEN 10; 325 MG/1; MG/1
1 TABLET ORAL 4 TIMES DAILY
Start: 2025-04-02

## 2025-04-02 RX ORDER — SODIUM CHLORIDE 9 MG/ML
40 INJECTION, SOLUTION INTRAVENOUS AS NEEDED
Status: DISCONTINUED | OUTPATIENT
Start: 2025-04-02 | End: 2025-04-02 | Stop reason: HOSPADM

## 2025-04-02 RX ORDER — ADENOSINE 3 MG/ML
INJECTION, SOLUTION INTRAVENOUS
Status: DISCONTINUED | OUTPATIENT
Start: 2025-04-02 | End: 2025-04-02 | Stop reason: HOSPADM

## 2025-04-02 RX ORDER — SODIUM CHLORIDE 0.9 % (FLUSH) 0.9 %
10 SYRINGE (ML) INJECTION EVERY 12 HOURS SCHEDULED
Status: DISCONTINUED | OUTPATIENT
Start: 2025-04-02 | End: 2025-04-02 | Stop reason: HOSPADM

## 2025-04-02 RX ORDER — CYANOCOBALAMIN 1000 UG/ML
1000 INJECTION, SOLUTION INTRAMUSCULAR; SUBCUTANEOUS
Start: 2025-04-02

## 2025-04-02 RX ORDER — PROPOFOL 10 MG/ML
VIAL (ML) INTRAVENOUS AS NEEDED
Status: DISCONTINUED | OUTPATIENT
Start: 2025-04-02 | End: 2025-04-02 | Stop reason: SURG

## 2025-04-02 RX ORDER — LIDOCAINE HYDROCHLORIDE 20 MG/ML
INJECTION, SOLUTION INFILTRATION; PERINEURAL
Status: DISCONTINUED | OUTPATIENT
Start: 2025-04-02 | End: 2025-04-02 | Stop reason: HOSPADM

## 2025-04-02 RX ORDER — FLUTICASONE PROPIONATE 50 MCG
1 SPRAY, SUSPENSION (ML) NASAL DAILY
Start: 2025-04-02

## 2025-04-02 RX ORDER — LEVOTHYROXINE SODIUM 25 MCG
25 TABLET ORAL DAILY
Start: 2025-04-02

## 2025-04-02 RX ORDER — PROPOFOL 10 MG/ML
INJECTION, EMULSION INTRAVENOUS CONTINUOUS PRN
Status: DISCONTINUED | OUTPATIENT
Start: 2025-04-02 | End: 2025-04-02 | Stop reason: SURG

## 2025-04-02 RX ADMIN — PROPOFOL 75 MCG/KG/MIN: 10 INJECTION, EMULSION INTRAVENOUS at 10:43

## 2025-04-02 RX ADMIN — ISOPROTERENOL HYDROCHLORIDE 4 MCG/MIN: 0.2 INJECTION, SOLUTION INTRACARDIAC; INTRAMUSCULAR; INTRAVENOUS; SUBCUTANEOUS at 11:40

## 2025-04-02 RX ADMIN — PROPOFOL 50 MG: 10 INJECTION, EMULSION INTRAVENOUS at 10:43

## 2025-04-02 NOTE — ANESTHESIA POSTPROCEDURE EVALUATION
Patient: Teresa Long    Procedure Summary       Date: 04/02/25 Room / Location: [facility] CATH LAB 4 /  [facility] CATH INVASIVE LOCATION    Anesthesia Start: 1032 Anesthesia Stop: 1204    Procedure: Ablation SVT Diagnosis:       Sustained SVT      (SVT (28466))    Providers: Mony Wray MD Provider: Pramod Logan CRNA    Anesthesia Type: MAC ASA Status: 3            Anesthesia Type: MAC    Vitals  No vitals data found for the desired time range.          Post Anesthesia Care and Evaluation    Patient participation: complete - patient participated  Level of consciousness: responsive to verbal stimuli  Pain management: adequate    Airway patency: patent  Anesthetic complications: No anesthetic complications  PONV Status: none  Cardiovascular status: acceptable  Respiratory status: acceptable  Hydration status: acceptable

## 2025-04-02 NOTE — Clinical Note
A 8 fr sheath was successfully inserted with ultrasound guidance into the right femoral vein. Sheath insertion not delayed. Pre closure done with suture

## 2025-04-02 NOTE — H&P
"Chief Complaint  SVT    Subjective        History of Present Illness    EP Problems:  1.  Sustained SVT versus atrial flutter     Cardiology Problems:  1.  Hypertension     Medical Problems:  1.  Hypothyroidism  2.  COPD  3.  GERD  4.  Anxiety  5.  JOSHUA Long is a 60 y.o. female with past medical history as above who presents to the hospital for outpatient EP study and ablation for treatment of SVT.  She has a history of symptomatic sustained SVT terminating with adenosine.  There is also been concern for possible atrial flutter.  She has been treated with sotalol and anticoagulation.  We discussed treatment options in the clinic including AAD use and ablation. After risk-benefit discussion, she chose to proceed with an ablation.    Since the time of the last clinic visit, denies significant change in symptoms.  Denies missing any doses of her medications.  No new ER visits or hospitalizations.      Allergies:  Desyrel [trazodone], Keftab [cephalexin], Ultram [tramadol hcl], and Paxil [paroxetine hcl]      Objective   Vital Signs:  /78 (BP Location: Left arm, Patient Position: Sitting)   Pulse 99   Temp 96.8 °F (36 °C) (Temporal)   Resp 14   Ht 167.6 cm (66\")   Wt 50.8 kg (112 lb)   SpO2 99%   BMI 18.08 kg/m²   Estimated body mass index is 18.08 kg/m² as calculated from the following:    Height as of this encounter: 167.6 cm (66\").    Weight as of this encounter: 50.8 kg (112 lb).      Physical Exam  Vitals reviewed.   Constitutional:       Appearance: Normal appearance.   Cardiovascular:      Rate and Rhythm: Normal rate and regular rhythm.      Pulses: Normal pulses.      Heart sounds: Normal heart sounds. No murmur heard.  Pulmonary:      Effort: Pulmonary effort is normal. No respiratory distress.      Breath sounds: Normal breath sounds.   Skin:     General: Skin is warm and dry.   Neurological:      General: No focal deficit present.      Mental Status: She is alert.   Psychiatric:       "   Mood and Affect: Mood normal.         Judgment: Judgment normal.            Result Review :  Labs were reviewed with relevant findings as follows: No relevant findings               Assessment and Plan   Assessment/plan:  Teresa Long is a 60 y.o. female who presents to the hospital for outpatient EP study and ablation for treatment of sustained SVT.  There are no apparent contraindications to proceeding and she is medically appropriate for outpatient management with this procedure.      I have previously discussed and again recapitulated risks, benefits, and alternatives of an electrophysiology study and ablation for supraventricular tachycardia with the patient.  Alternatives discussed include continued observation and medical management.  An electrophysiology study with ablation is an invasive procedure with possible complications that include but are not limited to vascular access complications, internal bleeding, tamponade requiring pericardiocentesis or cardiac surgery, stroke, MI, embolism, myocardial injury, injury to the normal conduction system requiring a pacemaker, and ultimately death.  We also discussed that given the uncertain nature of the diagnosis, therapeutic efficacy can be variable.  Possibilities of recurrent arrhythmias and the possible need for additional procedures and/or medical therapy was discussed. Questions asked were appropriately answered.  No guarantees were made or implied.    Despite this, they would still like to proceed.      Plan:   - Proceed with EP study and ablation            Part of this note may be an electronic transcription/translation of spoken language to printed text using the Dragon Dictation System.

## 2025-04-02 NOTE — Clinical Note
EP Catheter inserted. Immunizations: declines HPV, flu and Covid vaccines today, will consider; TDaP done today  Eye exam: done 2021  Pap Smear: done 2022  Mammogram: due age 38  Dexa: due post menopausal  Colonoscopy: due age 43  Labs: fasting labs ordered    Counseled patient regarding multimodal approach with healthy nutrition, healthy sleep, regular physical activity, social activities, counseling, safety measures and medications.

## 2025-04-02 NOTE — Clinical Note
A 10 fr sheath was successfully inserted with ultrasound guidance into the right femoral vein. Sheath insertion not delayed. Pre closure done with suture

## 2025-04-02 NOTE — ANESTHESIA PREPROCEDURE EVALUATION
Anesthesia Evaluation     no history of anesthetic complications:   NPO Solid Status: > 8 hours  NPO Liquid Status: > 8 hours           Airway   Mallampati: II  TM distance: >3 FB  No difficulty expected  Dental      Pulmonary    (+) a smoker Current, COPD,  Cardiovascular   Exercise tolerance: good (4-7 METS)    (+) hypertension, dysrhythmias (SVT)      Neuro/Psych  (-) seizures, TIA, CVA  GI/Hepatic/Renal/Endo    (+) GERD, thyroid problem hypothyroidism  (-) liver disease, no renal disease, diabetes    Musculoskeletal     Abdominal    Substance History      OB/GYN          Other      history of cancer (rectal cancer in remission) remission                  Anesthesia Plan    ASA 3     MAC     intravenous induction     Anesthetic plan, risks, benefits, and alternatives have been provided, discussed and informed consent has been obtained with: patient.    CODE STATUS:

## 2025-04-02 NOTE — Clinical Note
A 7 fr sheath was successfully inserted with ultrasound guidance into the right femoral vein. Sheath insertion not delayed. Pre closure done with suture

## 2025-04-03 ENCOUNTER — CALL CENTER PROGRAMS (OUTPATIENT)
Dept: CALL CENTER | Facility: HOSPITAL | Age: 60
End: 2025-04-03
Payer: COMMERCIAL

## 2025-04-03 NOTE — OUTREACH NOTE
PCI/Device Survey      Flowsheet Row Responses   Facility patient discharged from? [facility]   Procedure date 04/02/25   Procedure (if device, specify in description) Ablation   Performing MD Other (annotate)  [Dr. Mony Wray]   Attempt successful? No   Unsuccessful attempts Attempt 1            Ivonne Lynch Registered Nurse

## 2025-04-03 NOTE — OUTREACH NOTE
PCI/Device Survey      Flowsheet Row Responses   Facility patient discharged from? [facility]   Procedure date 04/02/25   Procedure (if device, specify in description) Ablation   Performing MD Other (annotate)  [Dr. Mony Wray]   Attempt successful? No   Unsuccessful attempts Attempt 2            Ivonne RANKIN - Registered Nurse

## 2025-04-05 LAB
QT INTERVAL: 362 MS
QTC INTERVAL: 466 MS

## 2025-05-05 ENCOUNTER — OFFICE VISIT (OUTPATIENT)
Dept: CARDIOLOGY | Facility: CLINIC | Age: 60
End: 2025-05-05
Payer: COMMERCIAL

## 2025-05-05 VITALS
HEART RATE: 112 BPM | DIASTOLIC BLOOD PRESSURE: 78 MMHG | SYSTOLIC BLOOD PRESSURE: 136 MMHG | BODY MASS INDEX: 18.09 KG/M2 | OXYGEN SATURATION: 98 % | HEIGHT: 66 IN

## 2025-05-05 DIAGNOSIS — I47.10 SUSTAINED SVT: Primary | ICD-10-CM

## 2025-05-05 DIAGNOSIS — R00.0 TACHYCARDIA: ICD-10-CM

## 2025-05-05 RX ORDER — DILTIAZEM HYDROCHLORIDE 120 MG/1
120 CAPSULE, EXTENDED RELEASE ORAL DAILY
Qty: 30 CAPSULE | Refills: 11 | Status: SHIPPED | OUTPATIENT
Start: 2025-05-05

## 2025-05-05 NOTE — PROGRESS NOTES
"EP FOLLOW UP PATIENT VISIT    Chief Complaint  Sustained SVT (Sinus tachycardia/Atrial Flutters/1 Month Hsp Follow up /Ablation 04.02.2025)    Subjective        History of Present Illness    EP Problems:  1.  Sustained SVT versus atrial flutter     Cardiology Problems:  1.  Hypertension     Medical Problems:  1.  Hypothyroidism  2.  COPD  3.  GERD  4.  Anxiety  5.  ADD      Patient is a 60-year-old woman who presents to the clinic today following up for sustained SVT. This is happening for several years with heart rates in the 150s lasting for several hours. She wore a Holter monitor last year that confirmed the SVT. She had been taking sotalol for this but was continued to have breakthrough episodes. She underwent successful SVT ablation on 4/2/2025. There was no inducible atrial flutter during this EP study.    She states that since her ablation she has been doing well. She said that her groin site healed up well. She said that she continues to have heart rates that go up as high as 120 but they never go higher than that. Her heart rate today is 112 bpm and she said that she is feeling okay. She said that she cannot remember a time that her heart rate was ever lower than 70 bpm. She took Xarelto for a week after her ablation and has since discontinued as instructed. She says that she still smokes occasionally and is trying to quit.    Objective   Vital Signs:  /78 (BP Location: Left arm, Patient Position: Sitting, Cuff Size: Adult)   Pulse 112   Ht 167.6 cm (65.98\")   SpO2 98%   BMI 18.09 kg/m²   Estimated body mass index is 18.09 kg/m² as calculated from the following:    Height as of this encounter: 167.6 cm (65.98\").    Weight as of 4/2/25: 50.8 kg (112 lb).      Physical Exam  Vitals reviewed.   Constitutional:       Appearance: Normal appearance. She is normal weight.   Cardiovascular:      Rate and Rhythm: Regular rhythm. Tachycardia present.      Pulses: Normal pulses.           Radial pulses are " 2+ on the right side and 2+ on the left side.        Posterior tibial pulses are 2+ on the right side and 2+ on the left side.      Heart sounds: Normal heart sounds.   Pulmonary:      Effort: Pulmonary effort is normal.      Breath sounds: Normal breath sounds.   Musculoskeletal:      Right lower leg: No edema.      Left lower leg: No edema.   Skin:     General: Skin is warm and dry.   Neurological:      Mental Status: She is alert.                    Result Review :  The following data was reviewed by: DEVAN Gómez on 05/05/2025:  CMP          4/2/2025    09:41   CMP   Glucose 94    BUN 9    Creatinine 0.42    EGFR 110.8    Sodium 141    Potassium 3.3    Chloride 103    Calcium 8.6    BUN/Creatinine Ratio 21.4    Anion Gap 10.0      CBC          4/2/2025    09:41   CBC   WBC 5.47    RBC 4.12    Hemoglobin 12.7    Hematocrit 38.3    MCV 93.0    MCH 30.8    MCHC 33.2    RDW 13.9    Platelets 335            ECG 12 Lead    Date/Time: 5/6/2025 8:42 AM  Performed by: Avila Santana APRN    Authorized by: Avila Santana APRN  Comparison: compared with previous ECG from 4/2/2025  Similar to previous ECG  Rhythm: sinus tachycardia  Rate: tachycardic  BPM: 112  QRS axis: normal    Clinical impression: abnormal EKG              Assessment and Plan   Diagnoses and all orders for this visit:    1. Sustained SVT (Primary)  -     dilTIAZem XR (DILACOR XR) 120 MG 24 hr capsule; Take 1 capsule by mouth Daily.  Dispense: 30 capsule; Refill: 11  -     ECG 12 Lead    2. Tachycardia  -     dilTIAZem XR (DILACOR XR) 120 MG 24 hr capsule; Take 1 capsule by mouth Daily.  Dispense: 30 capsule; Refill: 11  -     ECG 12 Lead             Plan:  - Given her elevated resting heart rates we will start her on diltiazem 120 milligrams daily for rate control.  - Follow-up 1 month to see how she is tolerating her diltiazem.  - Recommended smoking cessation and she says that she plans to try and cut back.                 Follow Up   Return  in about 1 month (around 6/5/2025).  Patient was given instructions and counseling regarding her condition or for health maintenance advice. Please see specific information pulled into the AVS if appropriate.     Part of this note may be an electronic transcription/translation of spoken language to printed text using the Dragon Dictation System.

## 2025-05-06 PROBLEM — R00.0 TACHYCARDIA: Status: ACTIVE | Noted: 2025-05-06

## 2025-05-20 PROCEDURE — 87086 URINE CULTURE/COLONY COUNT: CPT | Performed by: NURSE PRACTITIONER

## 2025-05-20 PROCEDURE — 87147 CULTURE TYPE IMMUNOLOGIC: CPT | Performed by: NURSE PRACTITIONER

## 2025-05-21 ENCOUNTER — RESULTS FOLLOW-UP (OUTPATIENT)
Dept: URGENT CARE | Facility: CLINIC | Age: 60
End: 2025-05-21
Payer: COMMERCIAL

## 2025-05-21 DIAGNOSIS — A49.1 STREPTOCOCCUS GROUP B INFECTION: Primary | ICD-10-CM

## 2025-05-21 RX ORDER — VANCOMYCIN HYDROCHLORIDE 250 MG/1
250 CAPSULE ORAL 4 TIMES DAILY
Qty: 28 CAPSULE | Refills: 0 | Status: SHIPPED | OUTPATIENT
Start: 2025-05-21

## 2025-05-28 ENCOUNTER — TELEPHONE (OUTPATIENT)
Dept: URGENT CARE | Facility: CLINIC | Age: 60
End: 2025-05-28
Payer: COMMERCIAL

## 2025-05-28 DIAGNOSIS — N30.01 ACUTE CYSTITIS WITH HEMATURIA: Primary | ICD-10-CM

## 2025-05-28 NOTE — TELEPHONE ENCOUNTER
Patient called back stating that the vancomycin that was sent in for her strep agalactiae UTI is not covered by insurance. This was sent in a week ago. Apparently patient was initially placed on Macrobid which did not appear to be effective when the culture came back. Then attempt to put her on Augmentin but due to patient cephalosporin allergy the decision was made by the provider to put her on vancomycin.    After speaking with the patient today she states her symptoms have improved some where she took the Macrobid for a couple of days. She states she can take amoxicillin and Augmentin without any trouble and has done it in the past. In fact she states she can take cephalosporins as well.    Prescription for Augmentin 875 twice a day for [facility] and she is advised to get rechecked if not fully resolving.

## 2025-06-02 ENCOUNTER — TELEPHONE (OUTPATIENT)
Dept: CARDIOLOGY | Facility: CLINIC | Age: 60
End: 2025-06-02
Payer: COMMERCIAL

## 2025-06-02 ENCOUNTER — DOCUMENTATION (OUTPATIENT)
Dept: CARDIOLOGY | Facility: CLINIC | Age: 60
End: 2025-06-02
Payer: COMMERCIAL

## 2025-06-02 NOTE — PROGRESS NOTES
Have been asked to comment upon the patient's use of Adderall.  She had a prior successful ablation for atrial tachycardia.  She is overall felt to be no higher risk at this time for Adderall use then the average patient and as always is to be determined on a risk-benefit basis by the ordering provider.    Sincerely,  Mony Wray

## 2025-06-02 NOTE — TELEPHONE ENCOUNTER
Call received from patient stating Dr. Kassidy Birmingham at [facility] is requesting documentation that it is okay from an EP standpoint to continue taking Adderall.

## 2025-06-03 ENCOUNTER — TELEPHONE (OUTPATIENT)
Age: 60
End: 2025-06-03
Payer: COMMERCIAL

## 2025-06-03 DIAGNOSIS — F41.9 ANXIETY: Primary | ICD-10-CM

## 2025-06-03 NOTE — TELEPHONE ENCOUNTER
Incoming Refill Request      Medication requested (name and dose): DIAZEPAM 10MG    Pharmacy where request should be sent: [facility]    Additional details provided by patient: PT SAYS SHE HAS APPT WITH [facility] ON 6/11 BUT IS CURRENTLY OUT OF MEDS. ASKED IF YOU WOULD BE ABLE TO GIVE HER A PARTIAL.    Best call back number: [phone]    Does the patient have less than a 3 day supply:  [x] Yes  [] No    Nila Dukes Rep  06/03/25, 16:13 CDT

## 2025-06-04 RX ORDER — DIAZEPAM 10 MG/1
10 TABLET ORAL 4 TIMES DAILY PRN
Qty: 32 TABLET | Refills: 0 | Status: SHIPPED | OUTPATIENT
Start: 2025-06-04

## 2025-06-04 NOTE — TELEPHONE ENCOUNTER
Patient notified and said that Dr. Birmingham said she may could do it but would prefer [facility] to do so. Doesn't see [facility] until 06/11, she would like script sent to [facility] until she sees them then.

## 2025-06-05 ENCOUNTER — OFFICE VISIT (OUTPATIENT)
Dept: CARDIOLOGY | Facility: CLINIC | Age: 60
End: 2025-06-05
Payer: COMMERCIAL

## 2025-06-05 VITALS
DIASTOLIC BLOOD PRESSURE: 78 MMHG | OXYGEN SATURATION: 97 % | BODY MASS INDEX: 18.84 KG/M2 | SYSTOLIC BLOOD PRESSURE: 162 MMHG | WEIGHT: 117.2 LBS | HEIGHT: 66 IN | HEART RATE: 97 BPM

## 2025-06-05 DIAGNOSIS — R00.0 TACHYCARDIA: Primary | ICD-10-CM

## 2025-06-05 DIAGNOSIS — I47.10 SUSTAINED SVT: ICD-10-CM

## 2025-06-05 RX ORDER — DEXTROAMPHETAMINE SACCHARATE, AMPHETAMINE ASPARTATE MONOHYDRATE, DEXTROAMPHETAMINE SULFATE AND AMPHETAMINE SULFATE 6.25; 6.25; 6.25; 6.25 MG/1; MG/1; MG/1; MG/1
CAPSULE, EXTENDED RELEASE ORAL
COMMUNITY

## 2025-06-05 RX ORDER — NITROFURANTOIN 25; 75 MG/1; MG/1
100 CAPSULE ORAL 2 TIMES DAILY
COMMUNITY

## 2025-06-05 RX ORDER — NICOTINE 21 MG/24HR
PATCH, TRANSDERMAL 24 HOURS TRANSDERMAL
COMMUNITY
Start: 2025-06-01

## 2025-06-05 NOTE — PROGRESS NOTES
"EP FOLLOW UP PATIENT VISIT    Chief Complaint  Sustained SVT (Sinus tachycardia/Tachycardia/Ablation 4.2.25/1 Month Follow Up )    Subjective        History of Present Illness    EP Problems:  1.  Sustained SVT versus atrial flutter     Cardiology Problems:  1.  Hypertension     Medical Problems:  1.  Hypothyroidism  2.  COPD  3.  GERD  4.  Anxiety  5.  ADD          Patient is a 60-year-old woman who presents to the clinic today following up for sustained SVT. She had been taking sotalol for SVT but continued to have breakthrough episodes. She underwent successful SVT ablation on 4/2/2025. There was no inducible atrial flutter during this EP study. At her last visit in May she was still having resting heart rates greater than 110 and was started on diltiazem 120 mg daily.    She says that she has been doing okay since her last visit. She has been taking her diltiazem and feels like her heart rates have improved. They have been staying under 100 more often. She says they are typically in the 90s now. Her blood pressure is elevated today at 162/78. She had trouble with parking today and has also been dealing with the loss of her brother and frequent UTIs and has been very stressed out. She denies any recurrence of SVT that she is aware of. She said that she has been having little bit of swelling in her lower extremities since she has been on her feet more.    Objective   Vital Signs:  /78 (BP Location: Left arm, Patient Position: Sitting, Cuff Size: Adult)   Pulse 97   Ht 167.6 cm (65.98\")   Wt 53.2 kg (117 lb 3.2 oz)   SpO2 97%   BMI 18.93 kg/m²   Estimated body mass index is 18.93 kg/m² as calculated from the following:    Height as of this encounter: 167.6 cm (65.98\").    Weight as of this encounter: 53.2 kg (117 lb 3.2 oz).      Physical Exam  Vitals reviewed.   Constitutional:       Appearance: Normal appearance. She is normal weight.   Cardiovascular:      Rate and Rhythm: Normal rate and regular " rhythm.      Pulses: Normal pulses.           Radial pulses are 2+ on the right side and 2+ on the left side.        Posterior tibial pulses are 2+ on the right side and 2+ on the left side.      Heart sounds: Normal heart sounds.   Pulmonary:      Effort: Pulmonary effort is normal.      Breath sounds: Normal breath sounds.   Musculoskeletal:      Right lower leg: Edema present.      Left lower leg: Edema present.   Skin:     General: Skin is warm and dry.   Neurological:      Mental Status: She is alert.                  Result Review :  The following data was reviewed by: DEVAN Gómez on 06/05/2025:  CMP          4/2/2025    09:41   CMP   Glucose 94    BUN 9    Creatinine 0.42    EGFR 110.8    Sodium 141    Potassium 3.3    Chloride 103    Calcium 8.6    BUN/Creatinine Ratio 21.4    Anion Gap 10.0      CBC          4/2/2025    09:41   CBC   WBC 5.47    RBC 4.12    Hemoglobin 12.7    Hematocrit 38.3    MCV 93.0    MCH 30.8    MCHC 33.2    RDW 13.9    Platelets 335            ECG 12 Lead    Date/Time: 6/5/2025 3:05 PM  Performed by: Avila Santana APRN    Authorized by: Avila Santana APRN  Comparison: compared with previous ECG from 5/5/2025  Similar to previous ECG  Rhythm: sinus rhythm  Rate: normal  BPM: 97  Conduction: non-specific intraventricular conduction delay  QRS axis: normal    Clinical impression: abnormal EKG              Assessment and Plan   Diagnoses and all orders for this visit:    1. Tachycardia (Primary)  -     ECG 12 Lead    2. Sustained SVT  -     ECG 12 Lead          Plan:  - Continue diltiazem 120 mg daily.  - Consider titrating up to 240 mg daily if she reports heart rates frequently above 100 bpm.  - Recommended she follow-up with her PCP regarding her blood pressure. She states that is usually in the 130s over 70s but today it is 162/78.  - Her psychiatric provider recently inquired about her Adderall use given her history of SVT. Since she has had an ablation she is not felt  to be a higher risk for Adderall use then any other average patient.                   Follow Up   Return in about 3 months (around 9/5/2025).  Patient was given instructions and counseling regarding her condition or for health maintenance advice. Please see specific information pulled into the AVS if appropriate.     Part of this note may be an electronic transcription/translation of spoken language to printed text using the Dragon Dictation System.

## 2025-06-16 ENCOUNTER — TELEPHONE (OUTPATIENT)
Dept: ONCOLOGY | Facility: CLINIC | Age: 60
End: 2025-06-16

## 2025-06-16 NOTE — TELEPHONE ENCOUNTER
Caller: Teresa Long    Relationship: Self    Best call back number: [phone]    What orders are you requesting (i.e. lab or imaging): LAB ORDERS    In what timeframe would the patient need to come in: BEFORE 7/1/25    Where will you receive your lab/imaging services: [facility]     Additional notes: PLEASE CALL PT WHEN ORDERS ARE FAXED TO [facility]

## 2025-06-17 DIAGNOSIS — C20 RECTAL CANCER: Primary | ICD-10-CM

## 2025-06-26 ENCOUNTER — LAB (OUTPATIENT)
Dept: LAB | Facility: HOSPITAL | Age: 60
End: 2025-06-26
Payer: COMMERCIAL

## 2025-06-26 ENCOUNTER — OFFICE VISIT (OUTPATIENT)
Age: 60
End: 2025-06-26
Payer: COMMERCIAL

## 2025-06-26 VITALS
SYSTOLIC BLOOD PRESSURE: 128 MMHG | HEART RATE: 123 BPM | BODY MASS INDEX: 20.42 KG/M2 | HEIGHT: 64 IN | RESPIRATION RATE: 16 BRPM | DIASTOLIC BLOOD PRESSURE: 82 MMHG | WEIGHT: 119.6 LBS | OXYGEN SATURATION: 94 % | TEMPERATURE: 98.9 F

## 2025-06-26 DIAGNOSIS — F90.9 ATTENTION DEFICIT HYPERACTIVITY DISORDER (ADHD), UNSPECIFIED ADHD TYPE: ICD-10-CM

## 2025-06-26 DIAGNOSIS — E55.9 VITAMIN D DEFICIENCY: ICD-10-CM

## 2025-06-26 DIAGNOSIS — S09.90XA INJURY OF HEAD, INITIAL ENCOUNTER: ICD-10-CM

## 2025-06-26 DIAGNOSIS — I10 ESSENTIAL HYPERTENSION: ICD-10-CM

## 2025-06-26 DIAGNOSIS — K21.9 GASTROESOPHAGEAL REFLUX DISEASE WITHOUT ESOPHAGITIS: ICD-10-CM

## 2025-06-26 DIAGNOSIS — E03.9 HYPOTHYROIDISM, UNSPECIFIED TYPE: ICD-10-CM

## 2025-06-26 DIAGNOSIS — C20 RECTAL CANCER: ICD-10-CM

## 2025-06-26 DIAGNOSIS — Z72.0 TOBACCO ABUSE: ICD-10-CM

## 2025-06-26 DIAGNOSIS — E53.8 VITAMIN B12 DEFICIENCY: ICD-10-CM

## 2025-06-26 DIAGNOSIS — G89.29 OTHER CHRONIC PAIN: ICD-10-CM

## 2025-06-26 DIAGNOSIS — F41.9 ANXIETY: ICD-10-CM

## 2025-06-26 DIAGNOSIS — I10 ESSENTIAL HYPERTENSION: Primary | ICD-10-CM

## 2025-06-26 LAB
BASOPHILS # BLD AUTO: 0.05 10*3/MM3 (ref 0–0.2)
BASOPHILS NFR BLD AUTO: 1 % (ref 0–1.5)
BILIRUB UR QL STRIP: NEGATIVE
CHOLEST SERPL-MCNC: 158 MG/DL (ref 0–200)
CLARITY UR: CLEAR
COLOR UR: YELLOW
DEPRECATED RDW RBC AUTO: 47.7 FL (ref 37–54)
EOSINOPHIL # BLD AUTO: 0.13 10*3/MM3 (ref 0–0.4)
EOSINOPHIL NFR BLD AUTO: 2.7 % (ref 0.3–6.2)
ERYTHROCYTE [DISTWIDTH] IN BLOOD BY AUTOMATED COUNT: 14.1 % (ref 12.3–15.4)
GLUCOSE UR STRIP-MCNC: NEGATIVE MG/DL
HCT VFR BLD AUTO: 42.5 % (ref 34–46.6)
HCV AB SER QL: NORMAL
HDLC SERPL-MCNC: 77 MG/DL (ref 40–60)
HGB BLD-MCNC: 13.6 G/DL (ref 12–15.9)
HGB UR QL STRIP.AUTO: NEGATIVE
IMM GRANULOCYTES # BLD AUTO: 0.01 10*3/MM3 (ref 0–0.05)
IMM GRANULOCYTES NFR BLD AUTO: 0.2 % (ref 0–0.5)
KETONES UR QL STRIP: NEGATIVE
LDLC SERPL CALC-MCNC: 68 MG/DL (ref 0–100)
LDLC/HDLC SERPL: 0.88 {RATIO}
LEUKOCYTE ESTERASE UR QL STRIP.AUTO: NEGATIVE
LYMPHOCYTES # BLD AUTO: 1.74 10*3/MM3 (ref 0.7–3.1)
LYMPHOCYTES NFR BLD AUTO: 36.5 % (ref 19.6–45.3)
MCH RBC QN AUTO: 29.5 PG (ref 26.6–33)
MCHC RBC AUTO-ENTMCNC: 32 G/DL (ref 31.5–35.7)
MCV RBC AUTO: 92.2 FL (ref 79–97)
MONOCYTES # BLD AUTO: 0.64 10*3/MM3 (ref 0.1–0.9)
MONOCYTES NFR BLD AUTO: 13.4 % (ref 5–12)
NEUTROPHILS NFR BLD AUTO: 2.2 10*3/MM3 (ref 1.7–7)
NEUTROPHILS NFR BLD AUTO: 46.2 % (ref 42.7–76)
NITRITE UR QL STRIP: NEGATIVE
NRBC BLD AUTO-RTO: 0 /100 WBC (ref 0–0.2)
PH UR STRIP.AUTO: 7 [PH] (ref 5–8)
PLATELET # BLD AUTO: 399 10*3/MM3 (ref 140–450)
PMV BLD AUTO: 8.8 FL (ref 6–12)
PROT UR QL STRIP: NEGATIVE
RBC # BLD AUTO: 4.61 10*6/MM3 (ref 3.77–5.28)
SP GR UR STRIP: 1.01 (ref 1–1.03)
TRIGL SERPL-MCNC: 66 MG/DL (ref 0–150)
TSH SERPL DL<=0.05 MIU/L-ACNC: 1.45 UIU/ML (ref 0.27–4.2)
UROBILINOGEN UR QL STRIP: NORMAL
VLDLC SERPL-MCNC: 13 MG/DL (ref 5–40)
WBC NRBC COR # BLD AUTO: 4.77 10*3/MM3 (ref 3.4–10.8)

## 2025-06-26 PROCEDURE — 85025 COMPLETE CBC W/AUTO DIFF WBC: CPT

## 2025-06-26 PROCEDURE — 82306 VITAMIN D 25 HYDROXY: CPT

## 2025-06-26 PROCEDURE — 86803 HEPATITIS C AB TEST: CPT

## 2025-06-26 PROCEDURE — 80061 LIPID PANEL: CPT

## 2025-06-26 PROCEDURE — 84443 ASSAY THYROID STIM HORMONE: CPT

## 2025-06-26 PROCEDURE — 81003 URINALYSIS AUTO W/O SCOPE: CPT

## 2025-06-26 PROCEDURE — 36415 COLL VENOUS BLD VENIPUNCTURE: CPT

## 2025-06-26 RX ORDER — CYANOCOBALAMIN 1000 UG/ML
1000 INJECTION, SOLUTION INTRAMUSCULAR; SUBCUTANEOUS
Status: SHIPPED | OUTPATIENT
Start: 2025-06-26

## 2025-06-26 RX ORDER — AZITHROMYCIN 250 MG/1
TABLET, FILM COATED ORAL
Qty: 6 TABLET | Refills: 0 | Status: SHIPPED | OUTPATIENT
Start: 2025-06-26

## 2025-06-26 RX ADMIN — CYANOCOBALAMIN 1000 MCG: 1000 INJECTION, SOLUTION INTRAMUSCULAR; SUBCUTANEOUS at 16:26

## 2025-06-26 NOTE — PROGRESS NOTES
Subjective   The ABCs of the Annual Wellness Visit  Annual Wellness Visit      Teresa Long is a 60 y.o. patient who presents for an Annual Wellness Visit.    The following portions of the patient's history were reviewed and   updated as appropriate: allergies, current medications, past family history, past medical history, past social history, past surgical history, and problem list.    Compared to one year ago, the patient's physical   health is the same.  Compared to one year ago, the patient's mental   health is the same.    Recent Hospitalizations:  She was not admitted to the hospital during the last year.     Current Medical Providers:  Patient Care Team:  Annie Hernandez APRN as PCP - General (Family Medicine)    Outpatient Medications Prior to Visit   Medication Sig Dispense Refill    amphetamine-dextroamphetamine XR (Adderall XR) 25 MG 24 hr capsule       buPROPion SR (WELLBUTRIN SR) 150 MG 12 hr tablet Take 1 tablet by mouth 2 (Two) Times a Day.      cyanocobalamin 1000 MCG/ML injection Inject 1 mL into the appropriate muscle as directed by prescriber Every 30 (Thirty) Days.      diazePAM (VALIUM) 10 MG tablet Take 1 tablet by mouth 4 (Four) Times a Day As Needed for Anxiety. 32 tablet 0    dilTIAZem CD (CARDIZEM CD) 120 MG 24 hr capsule Take 1 capsule by mouth Daily.      famotidine (PEPCID) 20 MG tablet Take 1 tablet by mouth 2 (Two) Times a Day.      fluticasone (FLONASE) 50 MCG/ACT nasal spray Administer 1 spray into the nostril(s) as directed by provider Daily.      HYDROcodone-acetaminophen (NORCO)  MG per tablet Take 1 tablet by mouth 4 (Four) Times a Day.      nicotine (NICODERM CQ) 21 MG/24HR patch       Synthroid 25 MCG tablet Take 1 tablet by mouth Daily.      ADVAIR DISKUS 100-50 MCG/DOSE DISKUS Daily.      Blood Glucose Monitoring Suppl (ONE TOUCH ULTRA 2) w/Device kit       cetirizine (zyrTEC) 10 MG tablet Take 1 tablet by mouth Daily.      loperamide (IMODIUM) 2 MG capsule  Take 1 capsule by mouth Daily. AS NEEDED      ONE TOUCH ULTRA TEST test strip       ONETOUCH DELICA LANCETS 33G misc       pantoprazole (PROTONIX) 40 MG EC tablet Take 1 tablet by mouth Daily.      triamterene-hydrochlorothiazide (DYAZIDE) 37.5-25 MG per capsule As Needed.      VENTOLIN  (90 BASE) MCG/ACT inhaler As Needed.      nitrofurantoin, macrocrystal-monohydrate, (MACROBID) 100 MG capsule Take 1 capsule by mouth 2 (Two) Times a Day. (Patient not taking: Reported on 6/26/2025)       No facility-administered medications prior to visit.     Opioid medication/s are on active medication list.  and I have evaluated her active treatment plan and pain score trends (see table).  There were no vitals filed for this visit.  I have reviewed the chart for potential of high risk medication and harmful drug interactions in the elderly.        Aspirin is not on active medication list.  Aspirin use is indicated based on review of current medical condition/s. Pros and cons of this therapy have been discussed with this patient. Benefits of this medication outweigh potential harm.  Patient has been instructed to start taking this medication..    Patient Active Problem List   Diagnosis    Chronic edema    Tobacco abuse    GERD (gastroesophageal reflux disease)    Hypothyroid    Anxiety    ADHD (attention deficit hyperactivity disorder)    Chronic pain    Essential hypertension    Rectal cancer    Sustained SVT    Chest pain, atypical    Shortness of breath    Tachycardia    Vitamin D deficiency     Advance Care Planning Advance Directive is not on file.  ACP discussion was held with the patient during this visit. Patient does not have an advance directive, information provided.   MOST form discussed, filled out and signed, and to be scanned to chart.           Objective   Vitals:    06/26/25 1430   BP: 128/82   BP Location: Left arm   Patient Position: Sitting   Cuff Size: Adult   Pulse: (!) 123   Resp: 16   Temp: 98.9 °F  "(37.2 °C)   TempSrc: Infrared   SpO2: 94%   Weight: 54.3 kg (119 lb 9.6 oz)   Height: 162.6 cm (64\")       Estimated body mass index is 20.53 kg/m² as calculated from the following:    Height as of this encounter: 162.6 cm (64\").    Weight as of this encounter: 54.3 kg (119 lb 9.6 oz).    BMI is within normal parameters. No other follow-up for BMI required.     Does the patient have evidence of cognitive impairment? No                                                                                                Health  Risk Assessment    Smoking Status:  Social History     Tobacco Use   Smoking Status Some Days    Current packs/day: 0.50    Average packs/day: 0.5 packs/day for 45.0 years (22.5 ttl pk-yrs)    Types: Cigarettes    Start date: 6/26/1980    Passive exposure: Current   Smokeless Tobacco Never     Alcohol Consumption:  Social History     Substance and Sexual Activity   Alcohol Use Yes    Comment: Occasional       Fall Risk Screen  STEADI Fall Risk Assessment has not been completed.    Depression Screening   Little interest or pleasure in doing things? Not at all   Feeling down, depressed, or hopeless? Not at all   PHQ-2 Total Score 0      Health Habits and Functional and Cognitive Screening:       No data to display                    Age-appropriate Screening Schedule:  Refer to the list below for future screening recommendations based on patient's age, sex and/or medical conditions. Orders for these recommended tests are listed in the plan section. The patient has been provided with a written plan.    Health Maintenance List  Health Maintenance   Topic Date Due    Pneumococcal Vaccine 50+ (1 of 2 - PCV) Never done    PAP SMEAR  Never done    MAMMOGRAM  Never done    ZOSTER VACCINE (1 of 2) Never done    LUNG CANCER SCREENING  Never done    TDAP/TD VACCINES (3 - Td or Tdap) 01/01/2017    HEPATITIS C SCREENING  Never done    COVID-19 Vaccine (1 - 2024-25 season) 07/10/2025 (Originally 9/1/2024)    " INFLUENZA VACCINE  07/01/2025    ANNUAL PHYSICAL  06/26/2026    COLONOSCOPY  Discontinued                                                                                                                                                CMS Preventative Services Quick Reference  Risk Factors Identified During Encounter  Chronic Pain: followed by Pain Mgmt  Depression/Dysphoria: followed by psychiatrist   Tobacco Use/Dependance Risk Teresa Long  reports that she has been smoking cigarettes. She started smoking about 45 years ago. She has a 22.5 pack-year smoking history. She has been exposed to tobacco smoke. She has never used smokeless tobacco. I have educated her on the risk of diseases from using tobacco products such as cancer, COPD, and heart disease.     I advised her to quit and she is not willing to quit.    I spent 5 minutes counseling the patient.        The above risks/problems have been discussed with the patient.  Pertinent information has been shared with the patient in the After Visit Summary.  An After Visit Summary and PPPS were made available to the patient.    Follow Up:   Next Medicare Wellness visit to be scheduled in 1 year.         Additional E&M Note during same encounter follows:  Patient has additional, significant, and separately identifiable condition(s)/problem(s) that require work above and beyond the Medicare Wellness Visit     Chief Complaint  Annual Exam and Leg Swelling (Bilateral leg swelling)    Subjective   HPI  TERESA is also being seen today for additional medical problem/s.        Also medical follow up regarding hypertension and hypothyroidism.   BP has been controlled. She has been on diltiazem cd 120 mg qd. She has been on synthroid for hypothyroidism. She was treated for a UTI a few weeks ago.  She is doing better now. She was given macrobid and then augmentin.     [facility] Dr. Kassidy Sandoval for ADHD, anxiety, and depression.    She  "is followed by Pain Management, [facility], Hematology.     Also c/o sinus congestion, drainage, cough for a few days.     She also c/o tripping and falling and hitting her head 2 days ago. She c/o a knot on the right top of her head. She denies loss of consciousness, dizziness, headache.     Objective   Vital Signs:  /82 (BP Location: Left arm, Patient Position: Sitting, Cuff Size: Adult)   Pulse (!) 123   Temp 98.9 °F (37.2 °C) (Infrared)   Resp 16   Ht 162.6 cm (64\")   Wt 54.3 kg (119 lb 9.6 oz)   SpO2 94%   BMI 20.53 kg/m²   Physical Exam  Vitals and nursing note reviewed.   Constitutional:       General: She is not in acute distress.     Appearance: Normal appearance. She is normal weight. She is not ill-appearing or toxic-appearing.   HENT:      Head: Normocephalic.      Right Ear: Tympanic membrane, ear canal and external ear normal. There is no impacted cerumen.      Left Ear: Tympanic membrane, ear canal and external ear normal. There is no impacted cerumen.      Nose: Nose normal. No congestion or rhinorrhea.      Mouth/Throat:      Mouth: Mucous membranes are moist.      Pharynx: Oropharynx is clear. No oropharyngeal exudate or posterior oropharyngeal erythema.   Eyes:      General: No scleral icterus.        Right eye: No discharge.         Left eye: No discharge.      Conjunctiva/sclera: Conjunctivae normal.      Pupils: Pupils are equal, round, and reactive to light.   Neck:      Vascular: No carotid bruit.   Cardiovascular:      Rate and Rhythm: Normal rate and regular rhythm.      Pulses: Normal pulses.      Heart sounds: Normal heart sounds. No murmur heard.     No friction rub. No gallop.   Pulmonary:      Effort: Pulmonary effort is normal. No respiratory distress.      Breath sounds: Normal breath sounds. No stridor. No wheezing, rhonchi or rales.   Abdominal:      General: Abdomen is flat. Bowel sounds are normal. There is no distension.      Palpations: Abdomen is soft. There " is no mass.      Tenderness: There is no abdominal tenderness. There is no guarding.   Musculoskeletal:         General: No swelling, tenderness, deformity or signs of injury. Normal range of motion.      Cervical back: Normal range of motion and neck supple. No rigidity.      Right lower leg: No edema.      Left lower leg: No edema.   Skin:     Coloration: Skin is not jaundiced or pale.      Findings: No bruising, erythema, lesion or rash.   Neurological:      General: No focal deficit present.      Mental Status: She is alert and oriented to person, place, and time. Mental status is at baseline.      Cranial Nerves: No cranial nerve deficit.      Sensory: No sensory deficit.      Motor: No weakness.      Coordination: Coordination normal.      Gait: Gait normal.   Psychiatric:         Mood and Affect: Mood normal.         Behavior: Behavior normal.         Thought Content: Thought content normal.         Judgment: Judgment normal.                    Assessment and Plan      Tobacco abuse         Gastroesophageal reflux disease without esophagitis    Orders:    TSH; Future    Hypothyroidism, unspecified type         Anxiety         Attention deficit hyperactivity disorder (ADHD), unspecified ADHD type  Psychological condition is stable.  Continue current treatment regimen.  Psychological condition  will be reassessed in 6 months.         Other chronic pain         Essential hypertension  Hypertension is stable and controlled  Continue current treatment regimen.  Blood pressure will be reassessed in 3 months.    Orders:    CBC & Differential; Future    Comprehensive Metabolic Panel; Future    Lipid Panel; Future    TSH; Future    Vitamin D,25-Hydroxy; Future    Hepatitis C antibody; Future    Urinalysis With Culture If Indicated -; Future    Rectal cancer         Vitamin D deficiency       Zpak for Bronchitis. F/u if not better. Labs ordered.   Injury of head, initial encounter    Orders:    CT Head Without  Contrast; Future            Follow Up   Return in about 3 months (around 9/26/2025).

## 2025-06-26 NOTE — ASSESSMENT & PLAN NOTE
Hypertension is stable and controlled  Continue current treatment regimen.  Blood pressure will be reassessed in 3 months.    Orders:    CBC & Differential; Future    Comprehensive Metabolic Panel; Future    Lipid Panel; Future    TSH; Future    Vitamin D,25-Hydroxy; Future    Hepatitis C antibody; Future    Urinalysis With Culture If Indicated -; Future

## 2025-06-27 ENCOUNTER — RESULTS FOLLOW-UP (OUTPATIENT)
Dept: LAB | Facility: HOSPITAL | Age: 60
End: 2025-06-27
Payer: COMMERCIAL

## 2025-06-27 LAB — 25(OH)D3 SERPL-MCNC: 31.3 NG/ML (ref 30–100)

## 2025-06-30 ENCOUNTER — LAB (OUTPATIENT)
Dept: LAB | Facility: HOSPITAL | Age: 60
End: 2025-06-30
Payer: COMMERCIAL

## 2025-06-30 DIAGNOSIS — I10 ESSENTIAL HYPERTENSION: ICD-10-CM

## 2025-06-30 LAB
ALBUMIN SERPL-MCNC: 4.4 G/DL (ref 3.5–5.2)
ALBUMIN/GLOB SERPL: 1.5 G/DL
ALP SERPL-CCNC: 85 U/L (ref 39–117)
ALT SERPL W P-5'-P-CCNC: 15 U/L (ref 1–33)
ANION GAP SERPL CALCULATED.3IONS-SCNC: 12 MMOL/L (ref 5–15)
AST SERPL-CCNC: 21 U/L (ref 1–32)
BILIRUB SERPL-MCNC: 0.4 MG/DL (ref 0–1.2)
BUN SERPL-MCNC: 4.9 MG/DL (ref 8–23)
BUN/CREAT SERPL: 11.1 (ref 7–25)
CALCIUM SPEC-SCNC: 9.9 MG/DL (ref 8.6–10.5)
CHLORIDE SERPL-SCNC: 99 MMOL/L (ref 98–107)
CO2 SERPL-SCNC: 29 MMOL/L (ref 22–29)
CREAT SERPL-MCNC: 0.44 MG/DL (ref 0.57–1)
EGFRCR SERPLBLD CKD-EPI 2021: 109.5 ML/MIN/1.73
GLOBULIN UR ELPH-MCNC: 2.9 GM/DL
GLUCOSE SERPL-MCNC: 76 MG/DL (ref 65–99)
POTASSIUM SERPL-SCNC: 4.4 MMOL/L (ref 3.5–5.2)
PROT SERPL-MCNC: 7.3 G/DL (ref 6–8.5)
SODIUM SERPL-SCNC: 140 MMOL/L (ref 136–145)

## 2025-06-30 PROCEDURE — 36415 COLL VENOUS BLD VENIPUNCTURE: CPT

## 2025-06-30 PROCEDURE — 80053 COMPREHEN METABOLIC PANEL: CPT

## 2025-06-30 NOTE — TELEPHONE ENCOUNTER
Attempted to contact pt- left v/m    I called down to lab and she couldn't see why CMP wasn't done and they got rid of the specimens yesterday so can't add on.

## 2025-06-30 NOTE — TELEPHONE ENCOUNTER
Patient was here at [facility] so she swung by and we notified her of her labs. The CMP order was active so we sent her down to get this done.

## 2025-07-08 NOTE — PROGRESS NOTES
[facility] GROUP HEMATOLOGY & ONCOLOGY  [address]  [place] [id number] [phone]    Patient Name: Teresa Long  Encounter Date: 07/15/2025  YOB: 1965  Patient Number: [id number]      REASON FOR FOLLOW-UP: Ms. 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  She had completed 5-FU with mitomycin C and radiation on 1/11/2010.  Patient is seen alone.  History obtained from the patient.  History is considered reliable.          Oncology/Hematology History Overview Note   DIAGNOSTIC ABNORMALITIES:  The patient had undergone endoscopic ultrasound 11/04/2009. It showed a T3 lower rectal lesion with enlarged lymph node in the perirectal tissue, 10 cm from the anal verge.  PET scan 11/06/2009 showed intense FDG uptake with SUV approximately 8 along the left aspect of the rectum/anus. Left inguinal and bilateral small iliac nodes demonstrated no abnormal uptake. The patient is planned for neoadjuvant chemoradiation.  The patient was seen by Dr. Leonard 11/11/2009. The patient presented with blood in her stool. Colonoscopy showed a 3 cm ulcerated distal rectal mass. Biopsies showed invasive squamous cell carcinoma. The patient also had multiple sessile 1-2 mm polyps between 10 and 15 cm; all were removed. There is a cyst in the liver, benign.   Rectal junction biopsy 03/25/2010 [facility] negative for malignancy.   Bone scan 09/23/2011 negative for metastasis.  CT of the abdomen and pelvis 05/01/2012 showed hepatic cyst. Left renal stone. Thickening of the wall of the urinary bladder.  Flexible sigmoidoscopy by Dr. 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 No evidence of tumor recurrence.   The patient was last seen by Dr. Leonard 07/22/2015. She is in remission. CEA was normal.  Colonoscopy by Dr. Jasiel See 01/13/2016  showed radiation proctitis. Multiple colon polyps removed. No evidence of tumor recurrence within the rectum.  The patient had undergone flexible sigmoidoscopy 05/02/2018. No evidence of tumor recurrence. Radiation proctitis and internal hemorrhoids were found.     PREVIOUS INTERVENTIONS:   The patient was given mitomycin C with continuous infusion of 5-FU, 11/19/2009 and cycle 2 was given 01/11/2010 by Dr. Leonard.       Rectal cancer   12/30/2019 Initial Diagnosis    Rectal cancer (CMS/HCC)     12/30/2019 Cancer Staged    Staging form: Colon and Rectum, AJCC V7  - Clinical stage from 12/30/2019: Stage IIA (T3, N0, M0) - Signed by Rashaun Antoine MD on 12/30/2019         PAST MEDICAL HISTORY:  ALLERGIES:  Allergies   Allergen Reactions    Desyrel [Trazodone] Palpitations    Keftab [Cephalexin] Hives    Ultram [Tramadol Hcl] Palpitations    Prednisone Hives    Paxil [Paroxetine Hcl] Palpitations    Tramadol Palpitations     CURRENT MEDICATIONS:  Outpatient Encounter Medications as of 7/15/2025   Medication Sig Dispense Refill    amphetamine-dextroamphetamine XR (Adderall XR) 25 MG 24 hr capsule       azithromycin (Zithromax Z-Aly) 250 MG tablet Take 2 tablets the first day, then 1 tablet daily for 4 days. 6 tablet 0    buPROPion SR (WELLBUTRIN SR) 150 MG 12 hr tablet Take 1 tablet by mouth 2 (Two) Times a Day.      cyanocobalamin 1000 MCG/ML injection Inject 1 mL into the appropriate muscle as directed by prescriber Every 30 (Thirty) Days.      diazePAM (VALIUM) 10 MG tablet Take 1 tablet by mouth 4 (Four) Times a Day As Needed for Anxiety. 32 tablet 0    dilTIAZem CD (CARDIZEM CD) 120 MG 24 hr capsule Take 1 capsule by mouth Daily.      famotidine (PEPCID) 20 MG tablet Take 1 tablet by mouth 2 (Two) Times a Day.      fluticasone (FLONASE) 50 MCG/ACT nasal spray Administer 1 spray into the nostril(s) as directed by provider Daily.      HYDROcodone-acetaminophen (NORCO)  MG per tablet Take 1 tablet by mouth 4  (Four) Times a Day.      nicotine (NICODERM CQ) 21 MG/24HR patch       Synthroid 25 MCG tablet Take 1 tablet by mouth Daily.       Facility-Administered Encounter Medications as of 7/15/2025   Medication Dose Route Frequency Provider Last Rate Last Admin    cyanocobalamin injection 1,000 mcg  1,000 mcg Intramuscular Q30 Days Annie Hernandez S, DEVAN   1,000 mcg at 06/26/25 1626     ADULT ILLNESSES:  Patient Active Problem List   Diagnosis Code    Chronic edema R60.9    Tobacco abuse Z72.0    GERD (gastroesophageal reflux disease) K21.9    Hypothyroid E03.9    Anxiety F41.9    ADHD (attention deficit hyperactivity disorder) F90.9    Chronic pain G89.29    Essential hypertension I10    Rectal cancer C20    Sustained SVT I47.10    Chest pain, atypical R07.89    Shortness of breath R06.02    Tachycardia R00.0    Vitamin D deficiency E55.9    Head injury S09.90XA     SURGERIES:  Past Surgical History:   Procedure Laterality Date    APPENDECTOMY      CARDIAC ABLATION  04/02/2025    CARDIAC ELECTROPHYSIOLOGY PROCEDURE N/A 04/02/2025    Procedure: Ablation SVT;  Surgeon: Mony Wray MD;  Location:  [facility];  Service: Cardiovascular;  Laterality: N/A;    DILATATION AND CURETTAGE      Of Uterus x 5    GYNECOLOGIC CRYOSURGERY      Lesion of cervix    TONSILLECTOMY      VENOUS ACCESS DEVICE (PORT) INSERTION AND REMOVAL  03/17/2015    Port implant and taken out     HEALTH MAINTENANCE ITEMS:  Health Maintenance Due   Topic Date Due    Pneumococcal Vaccine 50+ (1 of 2 - PCV) Never done    ZOSTER VACCINE (1 of 2) Never done    LUNG CANCER SCREENING  Never done    TDAP/TD VACCINES (3 - Td or Tdap) 01/01/2017    COVID-19 Vaccine (1 - 2024-25 season) Never done       <no information>  Last Completed Colonoscopy            Completed or No Longer Recommended       COLONOSCOPY  Discontinued        Frequency changed to Never automatically (Topic No Longer Applies)    05/01/2018  Outside Procedure: COLONOSCOPY  "                         Immunization History   Administered Date(s) Administered    Td (TDVAX) 09/09/1999    Tdap 01/01/2007     Last Completed Mammogram            Upcoming       MAMMOGRAM (Every 2 Years) Next due on 3/11/2027      03/11/2025  Done                              FAMILY HISTORY:  Family History   Problem Relation Age of Onset    Stroke Mother     Heart failure Mother      SOCIAL HISTORY:  Social History     Socioeconomic History    Marital status: Single   Tobacco Use    Smoking status: Some Days     Current packs/day: 0.50     Average packs/day: 0.5 packs/day for 45.1 years (22.5 ttl pk-yrs)     Types: Cigarettes     Start date: 6/26/1980     Passive exposure: Current    Smokeless tobacco: Never   Vaping Use    Vaping status: Never Used   Substance and Sexual Activity    Alcohol use: Yes     Comment: Occasional    Drug use: No    Sexual activity: Defer       REVIEW OF SYSTEMS:    Review of Systems   Constitutional:  Positive for fatigue. Negative for fever and unexpected weight change.        \"I am tired a lot.\"   HENT:  Negative for congestion.    Eyes:  Negative for redness.   Respiratory:  Negative for shortness of breath and wheezing.    Cardiovascular:  Positive for leg swelling. Negative for chest pain.        \"I am retaining fluid.\"   Gastrointestinal:  Negative for blood in stool, nausea and vomiting.   Endocrine: Negative for cold intolerance and heat intolerance.   Genitourinary:  Negative for dysuria.   Musculoskeletal:  Negative for gait problem.   Skin:  Negative for pallor.   Allergic/Immunologic: Negative for food allergies.   Neurological:  Positive for dizziness. Negative for speech difficulty and weakness.   Hematological:  Negative for adenopathy. Does not bruise/bleed easily.   Psychiatric/Behavioral:  Negative for agitation, confusion and hallucinations.        VITAL SIGNS: /76   Pulse 118   Temp 98.5 °F (36.9 °C)   Resp 16   Ht 162.6 cm (64\")   Wt 52.4 kg (115 lb " 9.6 oz)   SpO2 97%   Breastfeeding No   BMI 19.84 kg/m²  Gained 6 pounds.   Pain Score    07/15/25 1442   PainSc: 0-No pain       PHYSICAL EXAMINATION:     Physical Exam  Vitals reviewed.   Constitutional:       General: She is not in acute distress.  HENT:      Head: Normocephalic and atraumatic.   Eyes:      General: No scleral icterus.  Cardiovascular:      Rate and Rhythm: Normal rate.   Pulmonary:      Effort: No respiratory distress.      Breath sounds: No wheezing.   Abdominal:      General: Bowel sounds are normal.      Palpations: Abdomen is soft.   Musculoskeletal:         General: Swelling present.   Skin:     Coloration: Skin is not pale.   Neurological:      Mental Status: She is alert and oriented to person, place, and time.   Psychiatric:         Mood and Affect: Mood normal.         Behavior: Behavior normal.         Thought Content: Thought content normal.         Judgment: Judgment normal.         LABS    Lab Results - Last 18 Months   Lab Units 06/26/25  1617 04/02/25  0941 04/19/24  0656   HEMOGLOBIN g/dL 13.6 12.7 14.5   HEMATOCRIT % 42.5 38.3 43.2   MCV fL 92.2 93.0 93.3   WBC 10*3/mm3 4.77 5.47 5.6   RDW % 14.1 13.9 13.6   MPV fL 8.8 8.3 8.5*   PLATELETS 10*3/mm3 399 335 367   IMM GRAN % % 0.2  --   --    NEUTROS ABS 10*3/mm3 2.20 3.01  --    LYMPHS ABS 10*3/mm3 1.74  --   --    MONOS ABS 10*3/mm3 0.64  --   --    EOS ABS 10*3/mm3 0.13 0.11  --    BASOS ABS 10*3/mm3 0.05  --   --    IMMATURE GRANS (ABS) 10*3/mm3 0.01  --   --    NRBC /100 WBC 0.0 0.0  --    NEUTROPHIL % %  --  55.0  --    MONOCYTES % %  --  4.0*  --        Lab Results - Last 18 Months   Lab Units 06/30/25  1221 04/02/25  0941   GLUCOSE mg/dL 76 94   SODIUM mmol/L 140 141   POTASSIUM mmol/L 4.4 3.3*   CO2 mmol/L 29.0 28.0   CHLORIDE mmol/L 99 103   ANION GAP mmol/L 12.0 10.0   CREATININE mg/dL 0.44* 0.42*   BUN mg/dL 4.9* 9   BUN / CREAT RATIO  11.1 21.4   CALCIUM mg/dL 9.9 8.6   ALK PHOS U/L 85  --    TOTAL PROTEIN g/dL 7.3  " --    ALT (SGPT) U/L 15  --    AST (SGOT) U/L 21  --    BILIRUBIN mg/dL 0.4  --    ALBUMIN g/dL 4.4  --    GLOBULIN gm/dL 2.9  --        No results for input(s): \"MSPIKE\", \"KAPPALAMB\", \"IGLFLC\", \"URICACID\", \"FREEKAPPAL\", \"CEA\", \"LDH\", \"REFLABREPO\" in the last 11802 hours.    Lab Results - Last 18 Months   Lab Units 06/26/25  1617   TSH uIU/mL 1.450       Teresa Long reports a pain score of 0.           ASSESSMENT:  1.   Squamous cell anus, 11/04/2009.  Current Stage: AJCC IIA (cT3, cN0, M0).  Baseline Node Status: Negative by PET.  Tumor [place]: Left aspect of rectum.  Complications of Tumor: Hematochezia.  Treatment status: Treated with mitomycin C with CIV 5-FU and radiation 11/19/2009 and 01/11/2010 with radiation. Complete response.  2.   Performance status of 1.  3.   Left pelvic pain. No acute process. Bilateral osteoarthritis 2/23/2023.   4.   Lung nodules, right. Followed by Dr. Trevizo.  \"I see him once a year.\"  5.  Tobacco abuse. Per PCP. \"Every now and then.\"               PLAN:  1.    Re: Heme status.   WBC 6.1, hemoglobin 13.5, hematocrit 40.4, MCV 89.4 and platelets 352.   2.    Re: Pre office CMP.   ml/min.  3.    Re: Pre office CEA at 2.5.   4.    Re: Note from Dr. Wray 4/2/2025.  Patient seen for sustained SVT versus atrial flutter.  Post ablation on 4/2/2025.  5.    Re: Note from DEVAN Herring 6/26/2025.  Seen for annual exam.  Follow-up in 3 months.  6.  Patient will be seeing Dr. Manuel Doshi for colonoscopy, thickening of the transverse and descending colon by CT scan. \"I have to reschedule\"  7.   Re: Continued surveillance for anal cancer.  She will be seen every 12 months.  8.  Continue to follow with Dr. Trevizo for lung nodules.  \"It's coming up.\"  9.  Continue ongoing management per primary care physician and the other specialists.  9.   Plan of care discussed with patient.  Understanding expressed.  Patient is agreeable to " proceed.  10.   Return to office in 12 months with pre office CBC with differential, CMP, and CEA. Imaging as needed.             I have reviewed the assessment and plan and verified the accuracy of it. No changes to assessment and plan since the information was documented. Rashaun Antoine MD 07/15/25         I spent 31 total minutes, face-to-face, caring for Teresa schwartz. Greater than 50% of this time involved counseling and/or coordination of care as documented within this note.                  cc: (James Lang MD)        (Manuel Doshi MD)        (Kassidy Birmingham DNP)        (Fred Dorantes MD)        Shane Amaya MD)         (Nathan Oneil MD)         (LANA Isaacs)         DEVAN Bella APRN

## 2025-07-15 ENCOUNTER — OFFICE VISIT (OUTPATIENT)
Dept: ONCOLOGY | Facility: CLINIC | Age: 60
End: 2025-07-15
Payer: COMMERCIAL

## 2025-07-15 VITALS
SYSTOLIC BLOOD PRESSURE: 148 MMHG | DIASTOLIC BLOOD PRESSURE: 76 MMHG | BODY MASS INDEX: 19.74 KG/M2 | OXYGEN SATURATION: 97 % | HEART RATE: 118 BPM | WEIGHT: 115.6 LBS | HEIGHT: 64 IN | RESPIRATION RATE: 16 BRPM | TEMPERATURE: 98.5 F

## 2025-07-15 DIAGNOSIS — C21.0 ANAL CANCER: Primary | ICD-10-CM

## 2025-07-17 ENCOUNTER — HOSPITAL ENCOUNTER (OUTPATIENT)
Dept: CT IMAGING | Facility: HOSPITAL | Age: 60
Discharge: HOME | End: 2025-07-17
Admitting: NURSE PRACTITIONER
Payer: COMMERCIAL

## 2025-07-17 DIAGNOSIS — S09.90XA INJURY OF HEAD, INITIAL ENCOUNTER: ICD-10-CM

## 2025-07-17 PROCEDURE — 70450 CT HEAD/BRAIN W/O DYE: CPT

## (undated) DEVICE — KT NDL GUIDE STRL 18GA

## (undated) DEVICE — SI AVANTI+ 10F STD W/GW: Brand: AVANTI

## (undated) DEVICE — STERILE (15.2 TAPERED TO 7.6 X 183CM) POLYETHYLENE ACCORDION-FOLDED COVER FOR USE WITH SIEMENS ACUNAV ULTRASOUND CATHETER FAMILY CONNECTOR: Brand: SWIFTLINK TRANSDUCER COVER

## (undated) DEVICE — SOL NS 500ML

## (undated) DEVICE — SOL IRR NACL 0.9PCT BO 1000ML

## (undated) DEVICE — SYS CLS VASC/VENI VASCADE MVP 6TO12F

## (undated) DEVICE — INTRO STEER AGILIS NXT MED/CURL 8.5F

## (undated) DEVICE — SYS COL WAST NAMIC IV SGL/LN FML/FIT W/VNT/SPK/HD 72IN

## (undated) DEVICE — SOLIDIFIER LIQ LIQUILOC/PLUS W/TREAT 2000CC

## (undated) DEVICE — PRESSURE MONITORING SET: Brand: TRUWAVE

## (undated) DEVICE — Device: Brand: SMARTABLATE

## (undated) DEVICE — TBG PRESS/MONITR FIX M/F LL A/ 48IN STRL

## (undated) DEVICE — SI AVANTI+ 8F STD W/GW  NO OBT: Brand: AVANTI

## (undated) DEVICE — LIMB HOLDER, WRIST/ANKLE: Brand: DEROYAL

## (undated) DEVICE — SUREFIT, DUAL DISPERSIVE ELECTRODE, CONTACT QUALITY MONITOR: Brand: SUREFIT

## (undated) DEVICE — Device: Brand: WEBSTER CS

## (undated) DEVICE — BI-DIRECTIONAL NAVIGATION CATHETER, NAV, D-F: Brand: QDOT MICRO

## (undated) DEVICE — Device: Brand: REFERENCE PATCH CARTO 3

## (undated) DEVICE — PK CATH CARD 30 CA/4

## (undated) DEVICE — Device: Brand: SOUNDSTAR

## (undated) DEVICE — PAD, DEFIB, ADULT, RADIOTRANS, PHYSIO: Brand: MEDLINE

## (undated) DEVICE — SI AVANTI+ 7F STD W/GW  NO OBT: Brand: AVANTI

## (undated) DEVICE — STPCK 3/WY HP M/RA W/OFF/HNDL 1050PSI STRL

## (undated) DEVICE — Device: Brand: PENTARAY NAV